# Patient Record
Sex: FEMALE | Race: WHITE | HISPANIC OR LATINO
[De-identification: names, ages, dates, MRNs, and addresses within clinical notes are randomized per-mention and may not be internally consistent; named-entity substitution may affect disease eponyms.]

---

## 2022-04-07 ENCOUNTER — APPOINTMENT (OUTPATIENT)
Dept: SURGERY | Facility: CLINIC | Age: 37
End: 2022-04-07
Payer: MEDICAID

## 2022-04-07 VITALS
WEIGHT: 196 LBS | HEIGHT: 62 IN | SYSTOLIC BLOOD PRESSURE: 113 MMHG | TEMPERATURE: 97.5 F | BODY MASS INDEX: 36.07 KG/M2 | RESPIRATION RATE: 16 BRPM | DIASTOLIC BLOOD PRESSURE: 77 MMHG | HEART RATE: 95 BPM | OXYGEN SATURATION: 98 %

## 2022-04-07 PROCEDURE — 99204 OFFICE O/P NEW MOD 45 MIN: CPT

## 2022-04-07 NOTE — ASSESSMENT
[FreeTextEntry1] : Ms. LEONE is a 37 year old woman with ventral hernia (3 defects identified on CT scan). We discussed the importance of my reviewing the CT images prior to discussing surgical planning. We discussed that possible surgical options would range from minimally invasive repair with mesh to open repair with mesh, possible component separation, and possible botox. Weight loss would also be recommended before elective repair.

## 2022-04-07 NOTE — PLAN
[FreeTextEntry1] : No emergent surgical intervention\par Ms. Vargas will return to office with CT imaging disk

## 2022-04-07 NOTE — PHYSICAL EXAM
[No Rash or Lesion] : No rash or lesion [Alert] : alert [Oriented to Person] : oriented to person [Oriented to Place] : oriented to place [Oriented to Time] : oriented to time [Calm] : calm [JVD] : no jugular venous distention  [de-identified] : No acute distress [de-identified] : No respiratory distress [de-identified] : Regular rate [de-identified] : soft, nontender. no rebound or guarding. palpable supraumbilical and infraumbilical hernia defect along well-healed midline incision - reducible [de-identified] : normal range of motion

## 2022-04-07 NOTE — HISTORY OF PRESENT ILLNESS
[de-identified] : Ms. 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 Pt states that she was seen in [facility] ED 1 week ago for abdominal pain and nausea and had CT scan performed, which demonstrated "3 hernias" per patient. The patient reports intermittent pain, especially at end of day and with activity. No incarceration. Denies fever/chills/nausea/emesis or changes in bowel or bladder habits. Tolerating diet. Normal bowel movements.\par \par denies smoking\par \par Outside hospital records reviewed - 2 CT scan reads from January 2022 seen, which both report 3 ventral hernias (2 supraumbilical, one containing non-obstructed transverse colon 1 infraumbilical containing nonobstructed small bowel). No documentation from recent ED visit included

## 2022-04-08 ENCOUNTER — NON-APPOINTMENT (OUTPATIENT)
Age: 37
End: 2022-04-08

## 2022-04-14 ENCOUNTER — APPOINTMENT (OUTPATIENT)
Dept: SURGERY | Facility: CLINIC | Age: 37
End: 2022-04-14
Payer: MEDICAID

## 2022-04-14 VITALS
DIASTOLIC BLOOD PRESSURE: 64 MMHG | OXYGEN SATURATION: 98 % | HEIGHT: 62 IN | SYSTOLIC BLOOD PRESSURE: 110 MMHG | HEART RATE: 81 BPM | BODY MASS INDEX: 36.62 KG/M2 | RESPIRATION RATE: 16 BRPM | TEMPERATURE: 97.3 F | WEIGHT: 199 LBS

## 2022-04-14 PROCEDURE — 99214 OFFICE O/P EST MOD 30 MIN: CPT

## 2022-04-14 NOTE — PLAN
[FreeTextEntry1] : Referral to medical weight loss specialist provided \par 1-to-1 counseling with registered dietitian recommended \par Return to office in 1 month

## 2022-04-14 NOTE — ASSESSMENT
[FreeTextEntry1] : Ms. LEONE is a 37 year old woman with incisional hernia. We discussed the importance of preoperative weight loss to decrease risk of recurrence and morbidity. We discussed that surgical repair would be performed as an open repair with mesh, possible component separation with preoperative botox injection 3-4 weeks prior.

## 2022-04-14 NOTE — PHYSICAL EXAM
[No Rash or Lesion] : No rash or lesion [Alert] : alert [Oriented to Person] : oriented to person [Oriented to Place] : oriented to place [Oriented to Time] : oriented to time [Calm] : calm [JVD] : no jugular venous distention  [de-identified] : No acute distress [de-identified] : No respiratory distress [de-identified] : Regular rate [de-identified] : soft, nontender. no rebound or guarding. palpable supraumbilical and infraumbilical hernia defect along well-healed midline incision - reducible [de-identified] : normal range of motion

## 2022-04-14 NOTE — HISTORY OF PRESENT ILLNESS
[de-identified] :  number [id number]\par \par Ms. 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 U/S and CT imaging now available. The patient again reports intermittent pain, especially at end of day and with activity. Denies fever/chills/nausea/emesis or changes in bowel or bladder habits. Tolerating diet. Normal bowel movements.\par \par denies smoking\par \par Imaging reviewed: \par \par US- no evidence of cholelithiasis, hepatomegaly with hepatic steatosis\par CTAP- large complex incisional hernia appreciated, largest width ~8cm between rectus muscles

## 2022-04-19 ENCOUNTER — APPOINTMENT (OUTPATIENT)
Dept: SURGERY | Facility: CLINIC | Age: 37
End: 2022-04-19
Payer: MEDICAID

## 2022-04-19 ENCOUNTER — TRANSCRIPTION ENCOUNTER (OUTPATIENT)
Age: 37
End: 2022-04-19

## 2022-04-19 VITALS
DIASTOLIC BLOOD PRESSURE: 78 MMHG | OXYGEN SATURATION: 98 % | BODY MASS INDEX: 36.28 KG/M2 | RESPIRATION RATE: 16 BRPM | TEMPERATURE: 98.1 F | WEIGHT: 197.13 LBS | HEART RATE: 92 BPM | SYSTOLIC BLOOD PRESSURE: 113 MMHG | HEIGHT: 62 IN

## 2022-04-19 DIAGNOSIS — Z71.3 DIETARY COUNSELING AND SURVEILLANCE: ICD-10-CM

## 2022-04-19 DIAGNOSIS — E66.9 OBESITY, UNSPECIFIED: ICD-10-CM

## 2022-04-19 DIAGNOSIS — Z00.00 ENCOUNTER FOR GENERAL ADULT MEDICAL EXAMINATION W/OUT ABNORMAL FINDINGS: ICD-10-CM

## 2022-04-19 PROCEDURE — T1013A: CUSTOM

## 2022-04-19 PROCEDURE — 99213 OFFICE O/P EST LOW 20 MIN: CPT

## 2022-04-22 ENCOUNTER — APPOINTMENT (OUTPATIENT)
Dept: BARIATRICS/WEIGHT MGMT | Facility: CLINIC | Age: 37
End: 2022-04-22
Payer: MEDICAID

## 2022-04-22 ENCOUNTER — APPOINTMENT (OUTPATIENT)
Dept: BARIATRICS/WEIGHT MGMT | Facility: CLINIC | Age: 37
End: 2022-04-22

## 2022-04-22 DIAGNOSIS — Z78.9 OTHER SPECIFIED HEALTH STATUS: ICD-10-CM

## 2022-04-22 PROCEDURE — 99205 OFFICE O/P NEW HI 60 MIN: CPT | Mod: 95

## 2022-04-22 RX ORDER — NAPROXEN 375 MG/1
375 TABLET ORAL
Qty: 28 | Refills: 0 | Status: DISCONTINUED | COMMUNITY
Start: 2022-03-24

## 2022-04-22 RX ORDER — METHOCARBAMOL 750 MG/1
750 TABLET, FILM COATED ORAL
Qty: 20 | Refills: 0 | Status: DISCONTINUED | COMMUNITY
Start: 2022-04-05

## 2022-04-22 RX ORDER — ONDANSETRON 4 MG/1
4 TABLET ORAL
Qty: 15 | Refills: 0 | Status: DISCONTINUED | COMMUNITY
Start: 2022-03-24

## 2022-04-25 PROBLEM — E66.9 OBESITY, CLASS II, BMI 35-39.9: Status: ACTIVE | Noted: 2022-04-22

## 2022-04-25 PROBLEM — Z71.3 DIETARY COUNSELING AND SURVEILLANCE: Status: ACTIVE | Noted: 2022-04-25

## 2022-04-25 PROBLEM — Z00.00 ENCOUNTER FOR PREVENTIVE HEALTH EXAMINATION: Status: ACTIVE | Noted: 2022-04-06

## 2022-04-25 NOTE — REASON FOR VISIT
[Follow-Up Visit] : a follow-up visit for [Morbid Obesity (BMI<40)] : morbid obesity (bmi<40) [[employer] ] : provided by [employer]   [Time Spent: ____ minutes] : Total time spent using  services: [unfilled] minutes. The patient's primary language is not English thus required  services. [Interpreters_IDNumber] : [id number] [Interpreters_FullName] : Courtney

## 2022-04-25 NOTE — HISTORY OF PRESENT ILLNESS
[de-identified] : Ms. RONNY LEONE is a 37 year old with history of obesity, multiple hernias, was seen by Dr. Guerra for surgical evaluation. Here today for dietary counseling. Feels well, denies pain, fever, chills, nausea or vomiting.\par

## 2022-04-25 NOTE — ASSESSMENT
[FreeTextEntry1] : Pt is a 37 year old F, whose current wt is 197#, which is above IBW range and BMI is indicative of morbidly obese wt status (BMI: 36.05). Pt has a hernia and Dr. Guerra would like pt to lose weight prior to hernia repair.\par \par Breakfast: Eggs, Coffee (powder milk, sugar)\par Lunch: Beans, meat, rice or spaghetti\par Dinner: 1 cup of milk and cake\par Fluids: Juice\par Alcohol: None\par \par Pt reports old habits as large portion sizes and eating past the point of satisfied as obstacles to weight control. Patient admits to eating quickly, skipping meals, and poor food choices. Discussed the importance of a balanced, healthy diet of nourishing foods and consistent meal pattern for long-term wt loss success and health maintenance. Pt educated on cutting out caloric beverages sabotaging wt loss as caloric fluids provide little satiation and empty calories. Discussed how sugary beverages may be poorly tolerated post-operatively. Pt verbalized understanding and states She will cut out sugary drinks and increase water intake to facilitate adequate hydration. Also discussed consistent protein containing meals and snacks for hunger regulation. Pt verbalized understanding and expressed gratitude.\par \par Exercise: None - Discussed going on walks with family to a goal of 30 minutes per day. Pt verbalized understanding.  \par \par PRE-OPERATIVE NUTRITION GOALS: \par -Eliminate calories in beverages \par -Consume consistent protein containing meals \par -Work to improve quality of food choice to maximize nourishment post-operatively \par -Limit snacks outside of physical hunger \par -Increase physical activity to maximize wt loss goals\par \par Will follow up with pt in 1 month. RD to remain available for ongoing support and encouragement.

## 2022-05-05 ENCOUNTER — APPOINTMENT (OUTPATIENT)
Dept: ULTRASOUND IMAGING | Facility: CLINIC | Age: 37
End: 2022-05-05
Payer: MEDICAID

## 2022-05-05 PROCEDURE — 93880 EXTRACRANIAL BILAT STUDY: CPT

## 2022-05-13 NOTE — HISTORY OF PRESENT ILLNESS
[Quality of Life] : Quality of life [Improve Mood] : Improved mood [Improve Family's Health] : Improve family's health [Young Adult] : yound adult [Cut/Track Calories] : cut/track calories [Poor eating habits] : poor eating habits [Cravings] : cravings [Move to new town/country] : move to new town/country [I usually sleep 6-8 hours] : I usually sleep 6-8 hours [Dinner] : dinner [Morning] : morning [Other: ___] : [unfilled] [Less than 1] : Vegetables: less than 1 [1-2] : Meat, fish, poultry, eggs, cheese: 1-2 [3-5] : Sweets: 3-5 [2] : Fast food - meals per week: 2 [3] : How many cups of juice do you drink per day: 3 [1] : Cups of coffee per day: 1 [Milk] : milk [Sugar] : sugar [Self] : self [Spouse/partner] : spouse/partner [Overlarge portions] : overlarge portions [Skip Meals] : skip meals [2 blocks] : Walking distance capability:2 blocks [Walking] : walking [Biking] : biking [0] : 0 [None] : none [FreeTextEntry3] : 197 [] : No [FreeTextEntry1] : 37 year old with a  hx of medical obesity in need of wt loss for surgical hernia repair \par \par Breakfast\par 2 boiled eggs\par corn tortilla with cheese\par coffee with sugar and milk\par or \par corn tortilla with cheese \par coffee\par crackers  \par \par \par snack \par plum x 1\par cereal \par cracker with a sweet topping \par \par \par Lunch usually about 4 pm \par chicken \par salad  + dressing -ranch 1T \par boiled potato\par \par \par snack \par mandarin about 9 pm \par does not eat late at night\par \par no exercise \par no ETOH\par sleep 7-8\par \par She feels she is addicted to sweets \par \par

## 2022-05-13 NOTE — CONSULT LETTER
[Dear  ___] : Dear  [unfilled], [Consult Letter:] : I had the pleasure of evaluating your patient, [unfilled]. [Please see my note below.] : Please see my note below. [Sincerely,] : Sincerely, [FreeTextEntry3] : Marleen Jarrett

## 2022-05-13 NOTE — ASSESSMENT
[FreeTextEntry1] : 37 year old woman with a hx of morbid obesity for years never successful with wt loss now with a hernia that  needs repair however she needs to lose wt prior to repair for improved results.She complains of an addiction to sweets, cannot stop thinking about then and wanting to have sweets in her mouth, complains of fatigue after a  relatively small amount of walking.  She needs to have\par 1. labs\par 2 get involved with a nutritionist , will set up with Carie Cespedes who is Amharic speaking and who culturally understands her eating habit better \par 3 start walking 15 min 3x week with a goal of 150 min /week\par 4consider wellbutrin or wellbutrin/naltrexone for her addicition to food\par 5 get rid of the whites\par 6 no eating after 8 pm \par 7 no drinking her calories eg juices, soda\par 8 follow up appointment in person and tele\par 90 min \par \par 5/13/22: \par labs reviewed\par elevated insulin, choles 206/45/110/258 ,  , A!C 5.0

## 2022-05-13 NOTE — REASON FOR VISIT
[Home] : at home, [unfilled] , at the time of the visit. [Verbal consent obtained from patient] : the patient, [unfilled] [Initial Consultation] : an initial consultation for [Obesity] : obesity [FreeTextEntry2] : here for help with wt loss : spoken throught an , Nghia [id number]

## 2022-05-13 NOTE — REVIEW OF SYSTEMS
[Negative] : Genitourinary [MED-ROS-Gastro-FT] : constipation  [MED-FRANCISCO-Luis Miguel-FT] : chronic foot pain

## 2022-05-17 ENCOUNTER — APPOINTMENT (OUTPATIENT)
Dept: BARIATRICS/WEIGHT MGMT | Facility: CLINIC | Age: 37
End: 2022-05-17
Payer: MEDICAID

## 2022-05-17 VITALS — WEIGHT: 197 LBS | HEIGHT: 62 IN | BODY MASS INDEX: 36.25 KG/M2

## 2022-05-17 PROCEDURE — 97802 MEDICAL NUTRITION INDIV IN: CPT | Mod: 95

## 2022-05-18 ENCOUNTER — NON-APPOINTMENT (OUTPATIENT)
Age: 37
End: 2022-05-18

## 2022-05-18 LAB
25(OH)D3 SERPL-MCNC: 33.2 NG/ML
ALBUMIN SERPL ELPH-MCNC: 4.6 G/DL
ALP BLD-CCNC: 91 U/L
ALT SERPL-CCNC: 308 U/L
ANION GAP SERPL CALC-SCNC: 14 MMOL/L
AST SERPL-CCNC: 232 U/L
BASOPHILS # BLD AUTO: 0.04 K/UL
BASOPHILS NFR BLD AUTO: 0.5 %
BILIRUB SERPL-MCNC: 0.2 MG/DL
BUN SERPL-MCNC: 12 MG/DL
CALCIUM SERPL-MCNC: 9.8 MG/DL
CHLORIDE SERPL-SCNC: 102 MMOL/L
CHOLEST SERPL-MCNC: 206 MG/DL
CO2 SERPL-SCNC: 24 MMOL/L
CREAT SERPL-MCNC: 0.71 MG/DL
CRP SERPL-MCNC: 13 MG/L
EGFR: 114 ML/MIN/1.73M2
EOSINOPHIL # BLD AUTO: 0.19 K/UL
EOSINOPHIL NFR BLD AUTO: 2.2 %
ESTIMATED AVERAGE GLUCOSE: 97 MG/DL
GLUCOSE SERPL-MCNC: 94 MG/DL
HBA1C MFR BLD HPLC: 5 %
HCT VFR BLD CALC: 40.9 %
HDLC SERPL-MCNC: 45 MG/DL
HGB BLD-MCNC: 13.2 G/DL
IMM GRANULOCYTES NFR BLD AUTO: 0.5 %
INSULIN SERPL-MCNC: 32.6 UU/ML
LDLC SERPL CALC-MCNC: 110 MG/DL
LYMPHOCYTES # BLD AUTO: 2.87 K/UL
LYMPHOCYTES NFR BLD AUTO: 33 %
MAN DIFF?: NORMAL
MCHC RBC-ENTMCNC: 29.7 PG
MCHC RBC-ENTMCNC: 32.3 GM/DL
MCV RBC AUTO: 91.9 FL
MONOCYTES # BLD AUTO: 0.78 K/UL
MONOCYTES NFR BLD AUTO: 9 %
NEUTROPHILS # BLD AUTO: 4.79 K/UL
NEUTROPHILS NFR BLD AUTO: 54.8 %
NONHDLC SERPL-MCNC: 162 MG/DL
PLATELET # BLD AUTO: 403 K/UL
POTASSIUM SERPL-SCNC: 4.3 MMOL/L
PROT SERPL-MCNC: 7.5 G/DL
RBC # BLD: 4.45 M/UL
RBC # FLD: 13.3 %
SODIUM SERPL-SCNC: 140 MMOL/L
T3FREE SERPL-MCNC: 2.93 PG/ML
T4 FREE SERPL-MCNC: 1.1 NG/DL
TRIGL SERPL-MCNC: 258 MG/DL
TSH SERPL-ACNC: 1.57 UIU/ML
WBC # FLD AUTO: 8.71 K/UL

## 2022-05-19 ENCOUNTER — APPOINTMENT (OUTPATIENT)
Dept: SURGERY | Facility: CLINIC | Age: 37
End: 2022-05-19
Payer: MEDICAID

## 2022-05-19 VITALS
WEIGHT: 191.5 LBS | SYSTOLIC BLOOD PRESSURE: 111 MMHG | HEIGHT: 62 IN | TEMPERATURE: 98.7 F | OXYGEN SATURATION: 99 % | BODY MASS INDEX: 35.24 KG/M2 | HEART RATE: 71 BPM | DIASTOLIC BLOOD PRESSURE: 77 MMHG

## 2022-05-19 PROCEDURE — 99214 OFFICE O/P EST MOD 30 MIN: CPT

## 2022-05-19 NOTE — PHYSICAL EXAM
[JVD] : no jugular venous distention  [No Rash or Lesion] : No rash or lesion [Alert] : alert [Oriented to Person] : oriented to person [Oriented to Place] : oriented to place [Oriented to Time] : oriented to time [Calm] : calm [de-identified] : No acute distress [de-identified] : No respiratory distress [de-identified] : Regular rate [de-identified] : soft, nontender. no rebound or guarding. palpable supraumbilical and infraumbilical hernia defect along well-healed midline incision - reducible [de-identified] : normal range of motion

## 2022-05-19 NOTE — ASSESSMENT
[FreeTextEntry1] : Ms. LEONE is a 37 year old woman with incisional hernia. We again discussed the importance of preoperative weight loss to decrease risk of recurrence and morbidity. We discussed that surgical repair would be performed as an open repair with mesh, possible component separation with preoperative botox injection 3-4 weeks prior.

## 2022-05-19 NOTE — HISTORY OF PRESENT ILLNESS
[de-identified] :  number [id number]\par \par Ms. 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 She has lost 8 pounds since last visit. The patient again reports intermittent pain, especially at end of day and with activity. Denies fever/chills/nausea/emesis or changes in bowel or bladder habits. Tolerating diet. Normal bowel movements.\par \par denies smoking\par \par Imaging again reviewed: \par \par US- no evidence of cholelithiasis, hepatomegaly with hepatic steatosis\par CTAP- large complex incisional hernia appreciated, largest width ~8cm between rectus muscles

## 2022-05-19 NOTE — PLAN
[FreeTextEntry1] : Continue weight loss\par Return to office in 6 weeks to discuss operative planning

## 2022-06-03 ENCOUNTER — APPOINTMENT (OUTPATIENT)
Dept: ULTRASOUND IMAGING | Facility: CLINIC | Age: 37
End: 2022-06-03
Payer: MEDICAID

## 2022-06-03 PROCEDURE — 76705 ECHO EXAM OF ABDOMEN: CPT

## 2022-06-13 ENCOUNTER — APPOINTMENT (OUTPATIENT)
Dept: BARIATRICS/WEIGHT MGMT | Facility: CLINIC | Age: 37
End: 2022-06-13

## 2022-06-21 ENCOUNTER — APPOINTMENT (OUTPATIENT)
Dept: BARIATRICS/WEIGHT MGMT | Facility: CLINIC | Age: 37
End: 2022-06-21
Payer: MEDICAID

## 2022-06-21 PROCEDURE — 97803 MED NUTRITION INDIV SUBSEQ: CPT | Mod: 95

## 2022-06-22 VITALS — BODY MASS INDEX: 36.07 KG/M2 | WEIGHT: 196 LBS | HEIGHT: 62 IN

## 2022-06-30 ENCOUNTER — APPOINTMENT (OUTPATIENT)
Dept: SURGERY | Facility: CLINIC | Age: 37
End: 2022-06-30

## 2022-06-30 VITALS
DIASTOLIC BLOOD PRESSURE: 70 MMHG | WEIGHT: 194 LBS | HEIGHT: 62 IN | HEART RATE: 71 BPM | BODY MASS INDEX: 35.7 KG/M2 | OXYGEN SATURATION: 99 % | TEMPERATURE: 97.1 F | SYSTOLIC BLOOD PRESSURE: 106 MMHG | RESPIRATION RATE: 16 BRPM

## 2022-06-30 DIAGNOSIS — K43.2 INCISIONAL HERNIA W/OUT OBSTRUCTION OR GANGRENE: ICD-10-CM

## 2022-06-30 PROCEDURE — 99214 OFFICE O/P EST MOD 30 MIN: CPT

## 2022-06-30 NOTE — HISTORY OF PRESENT ILLNESS
[de-identified] : Ms. 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 Patient has lost 2 pounds overall since initial visit, but gained weight since last visit. The patient again reports intermittent pain, especially at end of day and with activity. Denies fever/chills/nausea/emesis or changes in bowel or bladder habits. Tolerating diet. Normal bowel movements.\par \par denies smoking\par \par Imaging again reviewed: \par \par US- no evidence of cholelithiasis, hepatomegaly with hepatic steatosis\par CTAP- large complex incisional hernia appreciated, largest width ~8cm between rectus muscles

## 2022-06-30 NOTE — PHYSICAL EXAM
[JVD] : no jugular venous distention  [No Rash or Lesion] : No rash or lesion [Alert] : alert [Oriented to Person] : oriented to person [Oriented to Place] : oriented to place [Oriented to Time] : oriented to time [Calm] : calm [de-identified] : No acute distress [de-identified] : No respiratory distress [de-identified] : Regular rate [de-identified] : soft, nontender. no rebound or guarding. palpable supraumbilical and infraumbilical hernia defect along well-healed midline incision - reducible [de-identified] : normal range of motion

## 2022-06-30 NOTE — ASSESSMENT
[FreeTextEntry1] : Ms. VASU JACOB is a 37 year old woman with incisional hernia. We discussed the options of robotic/laparoscopic repair, open repair, and nonoperative management. We discussed the risks/benefits of performing hernia repair as a retrorectus incisional hernia repair. We discussed possibility that a component separation would be required for tension free repair. We also discussed the risks/benefits of preoperative botox injection to decrease risks of recurrence and to decrease the likelihood that component separation would be required. The risks/benefits and alternatives were discussed at length and all questions were answered. We discussed the risks and benefits of the use of mesh. We also again reviewed the importance of preoperative weight loss to decrease risk of recurrence and morbidity. We discussed the option of waiting and attempting additional weight loss, howver patient states that she is experiencing pain at hernia site and does not want to wait any longer. The patient appears to understand and wishes to proceed with preoperative botox injection in the bilateral abdominal wall followed by open retrorectus incisional hernia repair with mesh, possible component separation. We will plan for surgery approximately 3-4 weeks following botox injection.\par

## 2022-06-30 NOTE — PLAN
[FreeTextEntry1] : Plan for preoperative botox injection in the bilateral abdominal wall followed by open retrorectus incisional hernia repair with mesh, possible component separation

## 2022-07-12 ENCOUNTER — OUTPATIENT (OUTPATIENT)
Dept: OUTPATIENT SERVICES | Facility: HOSPITAL | Age: 37
LOS: 1 days | End: 2022-07-12
Payer: MEDICAID

## 2022-07-12 VITALS
RESPIRATION RATE: 17 BRPM | HEART RATE: 74 BPM | TEMPERATURE: 98 F | SYSTOLIC BLOOD PRESSURE: 108 MMHG | OXYGEN SATURATION: 97 % | WEIGHT: 200.84 LBS | DIASTOLIC BLOOD PRESSURE: 67 MMHG | HEIGHT: 62 IN

## 2022-07-12 DIAGNOSIS — Z98.890 OTHER SPECIFIED POSTPROCEDURAL STATES: Chronic | ICD-10-CM

## 2022-07-12 DIAGNOSIS — Z98.891 HISTORY OF UTERINE SCAR FROM PREVIOUS SURGERY: Chronic | ICD-10-CM

## 2022-07-12 DIAGNOSIS — Z91.89 OTHER SPECIFIED PERSONAL RISK FACTORS, NOT ELSEWHERE CLASSIFIED: ICD-10-CM

## 2022-07-12 DIAGNOSIS — K43.2 INCISIONAL HERNIA WITHOUT OBSTRUCTION OR GANGRENE: ICD-10-CM

## 2022-07-12 DIAGNOSIS — Z01.818 ENCOUNTER FOR OTHER PREPROCEDURAL EXAMINATION: ICD-10-CM

## 2022-07-12 LAB
ALBUMIN SERPL ELPH-MCNC: 4.3 G/DL — SIGNIFICANT CHANGE UP (ref 3.3–5.2)
ALP SERPL-CCNC: 102 U/L — SIGNIFICANT CHANGE UP (ref 40–120)
ALT FLD-CCNC: 119 U/L — HIGH
ANION GAP SERPL CALC-SCNC: 11 MMOL/L — SIGNIFICANT CHANGE UP (ref 5–17)
APTT BLD: 29.7 SEC — SIGNIFICANT CHANGE UP (ref 27.5–35.5)
AST SERPL-CCNC: 82 U/L — HIGH
BASOPHILS # BLD AUTO: 0.03 K/UL — SIGNIFICANT CHANGE UP (ref 0–0.2)
BASOPHILS NFR BLD AUTO: 0.3 % — SIGNIFICANT CHANGE UP (ref 0–2)
BILIRUB SERPL-MCNC: 0.2 MG/DL — LOW (ref 0.4–2)
BUN SERPL-MCNC: 10.4 MG/DL — SIGNIFICANT CHANGE UP (ref 8–20)
CALCIUM SERPL-MCNC: 9.3 MG/DL — SIGNIFICANT CHANGE UP (ref 8.6–10.2)
CHLORIDE SERPL-SCNC: 101 MMOL/L — SIGNIFICANT CHANGE UP (ref 98–107)
CO2 SERPL-SCNC: 25 MMOL/L — SIGNIFICANT CHANGE UP (ref 22–29)
CREAT SERPL-MCNC: 0.57 MG/DL — SIGNIFICANT CHANGE UP (ref 0.5–1.3)
EGFR: 121 ML/MIN/1.73M2 — SIGNIFICANT CHANGE UP
EOSINOPHIL # BLD AUTO: 0.21 K/UL — SIGNIFICANT CHANGE UP (ref 0–0.5)
EOSINOPHIL NFR BLD AUTO: 2 % — SIGNIFICANT CHANGE UP (ref 0–6)
GLUCOSE SERPL-MCNC: 103 MG/DL — HIGH (ref 70–99)
HCG SERPL-ACNC: <4 MIU/ML — SIGNIFICANT CHANGE UP
HCT VFR BLD CALC: 37 % — SIGNIFICANT CHANGE UP (ref 34.5–45)
HGB BLD-MCNC: 12.3 G/DL — SIGNIFICANT CHANGE UP (ref 11.5–15.5)
IMM GRANULOCYTES NFR BLD AUTO: 0.6 % — SIGNIFICANT CHANGE UP (ref 0–1.5)
INR BLD: 0.99 RATIO — SIGNIFICANT CHANGE UP (ref 0.88–1.16)
LYMPHOCYTES # BLD AUTO: 2.66 K/UL — SIGNIFICANT CHANGE UP (ref 1–3.3)
LYMPHOCYTES # BLD AUTO: 25.4 % — SIGNIFICANT CHANGE UP (ref 13–44)
MCHC RBC-ENTMCNC: 29.2 PG — SIGNIFICANT CHANGE UP (ref 27–34)
MCHC RBC-ENTMCNC: 33.2 GM/DL — SIGNIFICANT CHANGE UP (ref 32–36)
MCV RBC AUTO: 87.9 FL — SIGNIFICANT CHANGE UP (ref 80–100)
MONOCYTES # BLD AUTO: 0.79 K/UL — SIGNIFICANT CHANGE UP (ref 0–0.9)
MONOCYTES NFR BLD AUTO: 7.6 % — SIGNIFICANT CHANGE UP (ref 2–14)
NEUTROPHILS # BLD AUTO: 6.71 K/UL — SIGNIFICANT CHANGE UP (ref 1.8–7.4)
NEUTROPHILS NFR BLD AUTO: 64.1 % — SIGNIFICANT CHANGE UP (ref 43–77)
PLATELET # BLD AUTO: 425 K/UL — HIGH (ref 150–400)
POTASSIUM SERPL-MCNC: 3.8 MMOL/L — SIGNIFICANT CHANGE UP (ref 3.5–5.3)
POTASSIUM SERPL-SCNC: 3.8 MMOL/L — SIGNIFICANT CHANGE UP (ref 3.5–5.3)
PROT SERPL-MCNC: 7.4 G/DL — SIGNIFICANT CHANGE UP (ref 6.6–8.7)
PROTHROM AB SERPL-ACNC: 11.5 SEC — SIGNIFICANT CHANGE UP (ref 10.5–13.4)
RBC # BLD: 4.21 M/UL — SIGNIFICANT CHANGE UP (ref 3.8–5.2)
RBC # FLD: 13.3 % — SIGNIFICANT CHANGE UP (ref 10.3–14.5)
SODIUM SERPL-SCNC: 137 MMOL/L — SIGNIFICANT CHANGE UP (ref 135–145)
WBC # BLD: 10.46 K/UL — SIGNIFICANT CHANGE UP (ref 3.8–10.5)
WBC # FLD AUTO: 10.46 K/UL — SIGNIFICANT CHANGE UP (ref 3.8–10.5)

## 2022-07-12 PROCEDURE — 85730 THROMBOPLASTIN TIME PARTIAL: CPT

## 2022-07-12 PROCEDURE — G0463: CPT

## 2022-07-12 PROCEDURE — 84702 CHORIONIC GONADOTROPIN TEST: CPT

## 2022-07-12 PROCEDURE — 36415 COLL VENOUS BLD VENIPUNCTURE: CPT

## 2022-07-12 PROCEDURE — 80053 COMPREHEN METABOLIC PANEL: CPT

## 2022-07-12 PROCEDURE — 85025 COMPLETE CBC W/AUTO DIFF WBC: CPT

## 2022-07-12 PROCEDURE — 85610 PROTHROMBIN TIME: CPT

## 2022-07-12 RX ORDER — SODIUM CHLORIDE 9 MG/ML
3 INJECTION INTRAMUSCULAR; INTRAVENOUS; SUBCUTANEOUS ONCE
Refills: 0 | Status: DISCONTINUED | OUTPATIENT
Start: 2022-07-19 | End: 2022-08-02

## 2022-07-12 NOTE — H&P PST ADULT - ASSESSMENT
Patient educated on surgical scrub, COVID testing 22, preadmission instructions and day of procedure medications, verbalizes understanding. Pt instructed to stop vitamins/supplements/herbal medications/ASA/NSAIDS for one week prior to surgery and discuss with PMD.    CAPRINI SCORE    AGE RELATED RISK FACTORS                                                             [ ] Age 41-60 years                                            (1 Point)  [ ] Age: 61-74 years                                           (2 Points)                 [ ] Age= 75 years                                                (3 Points)             DISEASE RELATED RISK FACTORS                                                       [ ] Edema in the lower extremities                 (1 Point)                     [ ] Varicose veins                                               (1 Point)                                 [ ] BMI > 25 Kg/m2                                            (1 Point)                                  [ ] Serious infection (ie PNA)                            (1 Point)                     [ ] Lung disease ( COPD, Emphysema)            (1 Point)                                                                          [ ] Acute myocardial infarction                         (1 Point)                  [ ] Congestive heart failure (in the previous month)  (1 Point)         [ ] Inflammatory bowel disease                            (1 Point)                  [ ] Central venous access, PICC or Port               (2 points)       (within the last month)                                                                [ ] Stroke (in the previous month)                        (5 Points)    [ ] Previous or present malignancy                       (2 points)                                                                                                                                                         HEMATOLOGY RELATED FACTORS                                                         [ ] Prior episodes of VTE                                     (3 Points)                     [ ] Positive family history for VTE                      (3 Points)                  [ ] Prothrombin 77685 A                                     (3 Points)                     [ ] Factor V Leiden                                                (3 Points)                        [ ] Lupus anticoagulants                                      (3 Points)                                                           [ ] Anticardiolipin antibodies                              (3 Points)                                                       [ ] High homocysteine in the blood                   (3 Points)                                             [ ] Other congenital or acquired thrombophilia      (3 Points)                                                [ ] Heparin induced thrombocytopenia                  (3 Points)                                        MOBILITY RELATED FACTORS  [ ] Bed rest                                                         (1 Point)  [ ] Plaster cast                                                    (2 points)  [ ] Bed bound for more than 72 hours           (2 Points)    GENDER SPECIFIC FACTORS  [ ] Pregnancy or had a baby within the last month   (1 Point)  [ ] Post-partum < 6 weeks                                   (1 Point)  [ ] Hormonal therapy  or oral contraception   (1 Point)  [ ] History of pregnancy complications              (1 point)  [ ] Unexplained or recurrent              (1 Point)    OTHER RISK FACTORS                                           (1 Point)  [ ] BMI >40, smoking, diabetes requiring insulin, chemotherapy  blood transfusions and length of surgery over 2 hours    SURGERY RELATED RISK FACTORS  [ ]  Section within the last month     (1 Point)  [ ] Minor surgery                                                  (1 Point)  [ ] Arthroscopic surgery                                       (2 Points)  [ ] Planned major surgery lasting more            (2 Points)      than 45 minutes     [ ] Elective hip or knee joint replacement       (5 points)       surgery                                                TRAUMA RELATED RISK FACTORS  [ ] Fracture of the hip, pelvis, or leg                       (5 Points)  [ ] Spinal cord injury resulting in paralysis             (5 points)       (in the previous month)    [ ] Paralysis  (less than 1 month)                             (5 Points)  [ ] Multiple Trauma within 1 month                        (5 Points)    Total Score [        ]    Caprini Score 0-2: Low Risk, NO VTE prophylaxis required for most patients, encourage ambulation  Caprini Score 3-6: Moderate Risk , pharmacologic VTE prophylaxis is indicated for most patients (in the absence of contraindications)  Caprini Score Greater than or =7: High risk, pharmocologic VTE prophylaxis indicated for most patients (in the absence of contraindications)    OPIOID RISK TOOL    CHAPARRO EACH BOX THAT APPLIES AND ADD TOTALS AT THE END    FAMILY HISTORY OF SUBSTANCE ABUSE                 FEMALE         MALE                                                Alcohol                             [  ]1 pt          [  ]3pts                                               Illegal Durgs                     [  ]2 pts        [  ]3pts                                               Rx Drugs                           [  ]4 pts        [  ]4 pts    PERSONAL HISTORY OF SUBSTANCE ABUSE                                                                                          Alcohol                             [  ]3 pts       [  ]3 pts                                               Illegal Drugs                     [  ]4 pts        [  ]4 pts                                               Rx Drugs                           [  ]5 pts        [  ]5 pts    AGE BETWEEN 16-45 YEARS                                      [  ]1 pt         [  ]1 pt    HISTORY OF PREADOLESCENT   SEXUAL ABUSE                                                             [  ]3 pts        [  ]0pts    PSYCHOLOGICAL DISEASE                     ADD, OCD, Bipolar, Schizophrenia        [  ]2 pts         [  ]2 pts                      Depression                                               [  ]1 pt           [  ]1 pt           SCORING TOTAL   (add numbers and type here)              (***)                                     A score of 3 or lower indicated LOW risk for future opioid abuse  A score of 4 to 7 indicated moderate risk for future opioid abuse  A score of 8 or higher indicates a high risk for opioid abuse   36 y/o female with PMH of elevated liver enzymes and hernia presents to [facility]. Patient states she has been found to have multiple hernias. She is scheduled to have the hernias repair in august but is going to have botox injections done in the abdomen first. Reports she feels she stomach is bloated and distended, denies pain or discomfort. Denies fevers, chills or vomiting. She states she often feels nausea. Patient is scheduled for Botox injection with anesthesia ordered by Dr. Guerra, going to be performed by Dr. Cisneros. Patient educated on surgical scrub, COVID testing 22, preadmission instructions and day of procedure medications, verbalizes understanding. Pt instructed to stop vitamins/supplements/herbal medications/ASA/NSAIDS for one week prior to surgery and discuss with PMD.    CAPRINI SCORE    AGE RELATED RISK FACTORS                                                             [ ] Age 41-60 years                                            (1 Point)  [ ] Age: 61-74 years                                           (2 Points)                 [ ] Age= 75 years                                                (3 Points)             DISEASE RELATED RISK FACTORS                                                       [ ] Edema in the lower extremities                 (1 Point)                     [ ] Varicose veins                                               (1 Point)                                 [x ] BMI > 25 Kg/m2                                            (1 Point)                                  [ ] Serious infection (ie PNA)                            (1 Point)                     [ ] Lung disease ( COPD, Emphysema)            (1 Point)                                                                          [ ] Acute myocardial infarction                         (1 Point)                  [ ] Congestive heart failure (in the previous month)  (1 Point)         [ ] Inflammatory bowel disease                            (1 Point)                  [ ] Central venous access, PICC or Port               (2 points)       (within the last month)                                                                [ ] Stroke (in the previous month)                        (5 Points)    [ ] Previous or present malignancy                       (2 points)                                                                                                                                                         HEMATOLOGY RELATED FACTORS                                                         [ ] Prior episodes of VTE                                     (3 Points)                     [ ] Positive family history for VTE                      (3 Points)                  [ ] Prothrombin 29142 A                                     (3 Points)                     [ ] Factor V Leiden                                                (3 Points)                        [ ] Lupus anticoagulants                                      (3 Points)                                                           [ ] Anticardiolipin antibodies                              (3 Points)                                                       [ ] High homocysteine in the blood                   (3 Points)                                             [ ] Other congenital or acquired thrombophilia      (3 Points)                                                [ ] Heparin induced thrombocytopenia                  (3 Points)                                        MOBILITY RELATED FACTORS  [ ] Bed rest                                                         (1 Point)  [ ] Plaster cast                                                    (2 points)  [ ] Bed bound for more than 72 hours           (2 Points)    GENDER SPECIFIC FACTORS  [ ] Pregnancy or had a baby within the last month   (1 Point)  [ ] Post-partum < 6 weeks                                   (1 Point)  [ ] Hormonal therapy  or oral contraception   (1 Point)  [ ] History of pregnancy complications              (1 point)  [ ] Unexplained or recurrent              (1 Point)    OTHER RISK FACTORS                                           (1 Point)  [ ] BMI >40, smoking, diabetes requiring insulin, chemotherapy  blood transfusions and length of surgery over 2 hours    SURGERY RELATED RISK FACTORS  [ ]  Section within the last month     (1 Point)  [x ] Minor surgery                                                  (1 Point)  [ ] Arthroscopic surgery                                       (2 Points)  [ ] Planned major surgery lasting more            (2 Points)      than 45 minutes     [ ] Elective hip or knee joint replacement       (5 points)       surgery                                                TRAUMA RELATED RISK FACTORS  [ ] Fracture of the hip, pelvis, or leg                       (5 Points)  [ ] Spinal cord injury resulting in paralysis             (5 points)       (in the previous month)    [ ] Paralysis  (less than 1 month)                             (5 Points)  [ ] Multiple Trauma within 1 month                        (5 Points)    Total Score [  2      ]    Caprini Score 0-2: Low Risk, NO VTE prophylaxis required for most patients, encourage ambulation  Caprini Score 3-6: Moderate Risk , pharmacologic VTE prophylaxis is indicated for most patients (in the absence of contraindications)  Caprini Score Greater than or =7: High risk, pharmocologic VTE prophylaxis indicated for most patients (in the absence of contraindications)    OPIOID RISK TOOL    CHAPARRO EACH BOX THAT APPLIES AND ADD TOTALS AT THE END    FAMILY HISTORY OF SUBSTANCE ABUSE                 FEMALE         MALE                                                Alcohol                             [  ]1 pt          [  ]3pts                                               Illegal Durgs                     [  ]2 pts        [  ]3pts                                               Rx Drugs                           [  ]4 pts        [  ]4 pts    PERSONAL HISTORY OF SUBSTANCE ABUSE                                                                                          Alcohol                             [  ]3 pts       [  ]3 pts                                               Illegal Drugs                     [  ]4 pts        [  ]4 pts                                               Rx Drugs                           [  ]5 pts        [  ]5 pts    AGE BETWEEN 16-45 YEARS                                      [x ]1 pt         [  ]1 pt    HISTORY OF PREADOLESCENT   SEXUAL ABUSE                                                             [  ]3 pts        [  ]0pts    PSYCHOLOGICAL DISEASE                     ADD, OCD, Bipolar, Schizophrenia        [  ]2 pts         [  ]2 pts                      Depression                                               [  ]1 pt           [  ]1 pt           SCORING TOTAL   (add numbers and type here)              (*1**)                                     A score of 3 or lower indicated LOW risk for future opioid abuse  A score of 4 to 7 indicated moderate risk for future opioid abuse  A score of 8 or higher indicates a high risk for opioid abuse

## 2022-07-12 NOTE — H&P PST ADULT - BSA (M2)
MRN-[id number]    HPI:  Patient is a 69 yo Rt handed male PMH CLL on Venetoclax, MDS, melanoma, paroxysmal AFib (not on AC) presenting as transfer from [place] for event capture. Patient was initially at  [facility] on 9/14 with confusion, aphasia and R sided weakness. Patient was stroke code and was given s/p tPA. On admission, patient had CTH, CTA, CT perfusion during code stroke protocol with no acute findings. MRI Brain was  performed at  9/16 with no ischemic findings. EEG performed 9/15 showed diffuse L hemispheric slowing. Pt remained aphasic with AMS. +RUE spasticity was noted on exam. Patient was thus transferred to [facility] for EMU admission to evaluate for subclinical seizures.  Of note, patient does not have hx of seizures.        
1.91

## 2022-07-12 NOTE — H&P PST ADULT - HISTORY OF PRESENT ILLNESS
38 y/o female with PMH of elevated liver enzymes and hernia presents to [facility]. Patient states she has been found to have multiple hernias. She is scheduled to have the hernias repair in august but is going to have botox injections done in the abdomen first. Reports she feels she stomach is bloated and distended, denies pain or discomfort. Denies fevers, chills or vomiting. She states she often feels nausea. Patient is scheduled for Botox injection with anesthesia ordered by Dr. Guerra, going to be performed by Dr. Cisneros.  38 y/o female with PMH of elevated liver enzymes and hernia presents to [facility]. Patient states she has been found to have multiple hernias. She is scheduled to have the hernias repair in august but is going to have botox injections done in the abdomen first. Reports she feels she stomach is bloated and distended, denies pain or discomfort. Denies fevers, chills or vomiting. She states she often feels nausea. Patient is scheduled for Botox injection with anesthesia ordered by Dr. Guerra, going to be performed by Dr. Treadwell.

## 2022-07-12 NOTE — H&P PST ADULT - NSICDXFAMILYHX_GEN_ALL_CORE_FT
FAMILY HISTORY:  Father  Still living? Unknown  FH: stomach cancer, Age at diagnosis: Age Unknown    Sibling  Still living? Unknown  FH: brain cancer, Age at diagnosis: Age Unknown  FH: multiple sclerosis, Age at diagnosis: Age Unknown

## 2022-07-12 NOTE — H&P PST ADULT - CARDIOVASCULAR
negative normal/regular rate and rhythm/S1 S2 present/no gallops/no rub/no murmur/no JVD/normal PMI/no pedal edema

## 2022-07-12 NOTE — H&P PST ADULT - PROBLEM SELECTOR PLAN 1
Patient is scheduled for Botox injection with anesthesia ordered by Dr. Guerra, going to be performed by Dr. Cisneros.

## 2022-07-12 NOTE — H&P PST ADULT - MUSCULOSKELETAL COMMENTS
B/L hands and feet occasionally Paramedian Forehead Flap Text: A decision was made to reconstruct the defect utilizing an interpolation axial flap and a staged reconstruction.  A telfa template was made of the defect.  This telfa template was then used to outline the paramedian forehead pedicle flap.  The donor area for the pedicle flap was then injected with anesthesia.  The flap was excised through the skin and subcutaneous tissue down to the layer of the underlying musculature.  The pedicle flap was carefully excised within this deep plane to maintain its blood supply.  The edges of the donor site were undermined.   The donor site was closed in a primary fashion.  The pedicle was then rotated into position and sutured.  Once the tube was sutured into place, adequate blood supply was confirmed with blanching and refill.  The pedicle was then wrapped with xeroform gauze and dressed appropriately with a telfa and gauze bandage to ensure continued blood supply and protect the attached pedicle.

## 2022-07-15 ENCOUNTER — NON-APPOINTMENT (OUTPATIENT)
Age: 37
End: 2022-07-15

## 2022-07-15 ENCOUNTER — APPOINTMENT (OUTPATIENT)
Dept: BARIATRICS/WEIGHT MGMT | Facility: CLINIC | Age: 37
End: 2022-07-15

## 2022-07-16 RX ORDER — ONABOTULINUMTOXINA 100 UNIT
300 VIAL (EA) INJECTION ONCE
Refills: 0 | Status: DISCONTINUED | OUTPATIENT
Start: 2022-07-19 | End: 2022-08-02

## 2022-07-19 ENCOUNTER — RESULT REVIEW (OUTPATIENT)
Age: 37
End: 2022-07-19

## 2022-07-19 ENCOUNTER — TRANSCRIPTION ENCOUNTER (OUTPATIENT)
Age: 37
End: 2022-07-19

## 2022-07-19 ENCOUNTER — OUTPATIENT (OUTPATIENT)
Dept: OUTPATIENT SERVICES | Facility: HOSPITAL | Age: 37
LOS: 1 days | End: 2022-07-19
Payer: MEDICAID

## 2022-07-19 VITALS
WEIGHT: 199.96 LBS | HEIGHT: 62 IN | OXYGEN SATURATION: 99 % | DIASTOLIC BLOOD PRESSURE: 66 MMHG | RESPIRATION RATE: 16 BRPM | SYSTOLIC BLOOD PRESSURE: 110 MMHG | HEART RATE: 70 BPM | TEMPERATURE: 99 F

## 2022-07-19 DIAGNOSIS — Z98.890 OTHER SPECIFIED POSTPROCEDURAL STATES: Chronic | ICD-10-CM

## 2022-07-19 DIAGNOSIS — K43.2 INCISIONAL HERNIA WITHOUT OBSTRUCTION OR GANGRENE: ICD-10-CM

## 2022-07-19 DIAGNOSIS — Z98.891 HISTORY OF UTERINE SCAR FROM PREVIOUS SURGERY: Chronic | ICD-10-CM

## 2022-07-19 LAB — HCG UR QL: NEGATIVE — SIGNIFICANT CHANGE UP

## 2022-07-19 PROCEDURE — 81025 URINE PREGNANCY TEST: CPT

## 2022-07-19 PROCEDURE — 64647 CHEMODENERV TRUNK MUSC 6/>: CPT

## 2022-07-19 PROCEDURE — 76942 ECHO GUIDE FOR BIOPSY: CPT

## 2022-07-19 NOTE — ASU DISCHARGE PLAN (ADULT/PEDIATRIC) - NS MD DC FALL RISK RISK
For information on Fall & Injury Prevention, visit: [email or web address] OR  [email or web address] OR  https://www.cdc.gov/steadi/patient.html

## 2022-07-19 NOTE — ASU DISCHARGE PLAN (ADULT/PEDIATRIC) - ASU DC SPECIAL INSTRUCTIONSFT
Botox Injection Discharge    Discharge Instructions  - You have had botox injected into your abdominal wall.  - Botox can cause muscle weakness and paralysis and if you have any symptoms related to shortness of breath or difficulty breathing, call 911 right away.  - If you experience any abnormal or new abdominal pain, please call the IR physician on call or 911.  - You may shower in 24 hours. No soaking or swimming until the site is completely healed.  - Keep the area covered and dry for the next 24 hours.  - Do not perform any heavy lifting for the next few days or until the site is healed.  - You may resume your normal diet.  - You may resume your normal medications however you should wait 48 hours before restarting aspirin, plavix, or blood thinners.  - It is normal to experience some pain over the site for the next few days. You may take apply ice to the area (20 minutes on, 20 minutes off) and take Tylenol for that pain. Do not take more frequently than every 6 hours and do not exceed more than 3000mg of Tylenol in a 24 hour period.    - You were given conscious sedation which may make you drowsy, therefore you need someone to stay with you until the morning following the procedure.  - Do not drive, engage in heavy lifting or strenuous activity, or drink any alcoholic beverages for the next 24 hours.   - You may resume normal activity in 24 hours.    Notify your primary physician and/or Interventional Radiology IMMEDIATELY if you experience any of the following       - Fever of 101F or 38C       - Chills or Rigors/ Shakes       - Swelling and/or Redness in the area around the biopsy site       - Worsening Pain       - Blood soaked bandages or worsening bleeding       - Lightheadedness and/or dizziness upon standing       - Chest Pain/ Tightness       - Shortness of Breath       - Difficulty walking    If you have a problem that you believe requires IMMEDIATE attention, please go to your NEAREST Emergency Room. If you believe your problem can safely wait until you speak to a physician, please call Interventional Radiology for any concerns.    During Normal Weekday Business Hours- You can contact the Interventional Radiology department during normal business hours via telephone.  During Evenings and Weekends- If you need to contact Interventional Radiology during off hours, do so by calling the hospital and requesting to be connected to the Interventional Radiologist on call.

## 2022-07-19 NOTE — PROGRESS NOTE ADULT - SUBJECTIVE AND OBJECTIVE BOX
IR Post Procedure Note    Diagnosis: Hernia    Procedure: Abdominal botox injection    : Jake Treadwell MD    Contrast: None    Anesthesia: 1% Lidocaine Subcutaneous, Sedation administered by Anesthesiology    Estimated Blood Loss: Less than 10cc    Specimens: None    Complications: No Immediate Complications    Anticoagulation: Resume without Restriction    Findings & Plan: Abdominal botox injection in 6 sites in 3 layers of abdominal muscles      Please call Interventional Radiology with any questions, concerns, or issues.

## 2022-07-19 NOTE — ASU DISCHARGE PLAN (ADULT/PEDIATRIC) - WILL THE PATIENT ACCEPT THE PFIZER COVID-19 VACCINE IF ELIGIBLE AND IT IS AVAILABLE?
[facility]  [address] [place] [address]    Coronary Angiogram (Radial/Ulnar Approach) After Care    Refer to this sheet in the next few weeks. These instructions provide you with information on caring for yourself after your procedure. Your caregiver may also give you more specific instructions. Your treatment has been planned according to current medical practices, but problems sometimes occur. Call your caregiver if you have any problems or questions after your procedure.    Home Care Instructions:  · You may shower the day after the procedure. Remove the bandage (dressing) and gently wash the site with plain soap and water. Gently pat the site dry. You may apply a band aid daily for 2 days if desired.    · Do not apply powder or lotion to the site.  · Do not submerge the affected site in water for 3 to 5 days or until the site is completely healed.   · Do not lift, push or pull anything over 5 pounds for 5 days after your procedure. As a reference, a gallon of milk weighs 8 pounds.   · Inspect the site at least twice daily. You may notice some bruising at the site and it may be tender for 1 to 2 weeks.     · Increase your fluid intake for the next 2 days.    · Keep arm elevated for 24 hours. For the remainder of the day, keep your arm in “Pledge of Allegiance” position when up and about.     · You may drive 24 hours after the procedure unless otherwise instructed by your caregiver.  · Do not operate machinery or power tools for 24 hours.  · A responsible adult should be with you for the first 24 hours after you arrive home. Do not make any important legal decisions or sign legal papers for 24 hours.  Do not drink alcohol for 24 hours.    · Metformin or any medications containing Metformin should not be taken for 48 hours after your procedure.      Call Your Doctor if:   · You have unusual pain at the radial/ulnar (wrist) site.  · You have redness, warmth, swelling, or pain at the  radial/ulnar (wrist) site.  · You have drainage (other than a small amount of blood on the dressing).  · You have chills or a fever > 101.  · Your arm becomes pale or dark, cool, tingly, or numb.  · You have heavy bleeding from the site, hold pressure on the site for 20 minutes.  If the bleeding stops, apply a fresh bandage and call your cardiologist.  However, if you continue to have bleeding, call 911.         Not applicable

## 2022-07-27 PROBLEM — R74.8 ABNORMAL LEVELS OF OTHER SERUM ENZYMES: Chronic | Status: ACTIVE | Noted: 2022-07-12

## 2022-07-29 ENCOUNTER — OUTPATIENT (OUTPATIENT)
Dept: OUTPATIENT SERVICES | Facility: HOSPITAL | Age: 37
LOS: 1 days | End: 2022-07-29

## 2022-07-29 VITALS
TEMPERATURE: 97 F | SYSTOLIC BLOOD PRESSURE: 107 MMHG | RESPIRATION RATE: 16 BRPM | HEART RATE: 68 BPM | DIASTOLIC BLOOD PRESSURE: 68 MMHG | OXYGEN SATURATION: 94 % | WEIGHT: 201.5 LBS | HEIGHT: 62 IN

## 2022-07-29 DIAGNOSIS — Z98.891 HISTORY OF UTERINE SCAR FROM PREVIOUS SURGERY: Chronic | ICD-10-CM

## 2022-07-29 DIAGNOSIS — Z98.890 OTHER SPECIFIED POSTPROCEDURAL STATES: Chronic | ICD-10-CM

## 2022-07-29 DIAGNOSIS — Z01.818 ENCOUNTER FOR OTHER PREPROCEDURAL EXAMINATION: ICD-10-CM

## 2022-07-29 DIAGNOSIS — K43.2 INCISIONAL HERNIA WITHOUT OBSTRUCTION OR GANGRENE: ICD-10-CM

## 2022-07-29 DIAGNOSIS — Z29.9 ENCOUNTER FOR PROPHYLACTIC MEASURES, UNSPECIFIED: ICD-10-CM

## 2022-07-29 LAB
A1C WITH ESTIMATED AVERAGE GLUCOSE RESULT: 5.1 % — SIGNIFICANT CHANGE UP (ref 4–5.6)
ANION GAP SERPL CALC-SCNC: 8 MMOL/L — SIGNIFICANT CHANGE UP (ref 5–17)
APTT BLD: 31 SEC — SIGNIFICANT CHANGE UP (ref 27.5–35.5)
BLD GP AB SCN SERPL QL: SIGNIFICANT CHANGE UP
BUN SERPL-MCNC: 12.1 MG/DL — SIGNIFICANT CHANGE UP (ref 8–20)
CALCIUM SERPL-MCNC: 9 MG/DL — SIGNIFICANT CHANGE UP (ref 8.4–10.5)
CHLORIDE SERPL-SCNC: 101 MMOL/L — SIGNIFICANT CHANGE UP (ref 98–107)
CO2 SERPL-SCNC: 28 MMOL/L — SIGNIFICANT CHANGE UP (ref 22–29)
CREAT SERPL-MCNC: 0.55 MG/DL — SIGNIFICANT CHANGE UP (ref 0.5–1.3)
EGFR: 123 ML/MIN/1.73M2 — SIGNIFICANT CHANGE UP
ESTIMATED AVERAGE GLUCOSE: 100 MG/DL — SIGNIFICANT CHANGE UP (ref 68–114)
GLUCOSE SERPL-MCNC: 94 MG/DL — SIGNIFICANT CHANGE UP (ref 70–99)
HCT VFR BLD CALC: 39.1 % — SIGNIFICANT CHANGE UP (ref 34.5–45)
HGB BLD-MCNC: 13.2 G/DL — SIGNIFICANT CHANGE UP (ref 11.5–15.5)
INR BLD: 1.11 RATIO — SIGNIFICANT CHANGE UP (ref 0.88–1.16)
MCHC RBC-ENTMCNC: 29.9 PG — SIGNIFICANT CHANGE UP (ref 27–34)
MCHC RBC-ENTMCNC: 33.8 GM/DL — SIGNIFICANT CHANGE UP (ref 32–36)
MCV RBC AUTO: 88.5 FL — SIGNIFICANT CHANGE UP (ref 80–100)
MRSA PCR RESULT.: SIGNIFICANT CHANGE UP
PLATELET # BLD AUTO: 391 K/UL — SIGNIFICANT CHANGE UP (ref 150–400)
POTASSIUM SERPL-MCNC: 4.1 MMOL/L — SIGNIFICANT CHANGE UP (ref 3.5–5.3)
POTASSIUM SERPL-SCNC: 4.1 MMOL/L — SIGNIFICANT CHANGE UP (ref 3.5–5.3)
PROTHROM AB SERPL-ACNC: 12.9 SEC — SIGNIFICANT CHANGE UP (ref 10.5–13.4)
RBC # BLD: 4.42 M/UL — SIGNIFICANT CHANGE UP (ref 3.8–5.2)
RBC # FLD: 13.2 % — SIGNIFICANT CHANGE UP (ref 10.3–14.5)
S AUREUS DNA NOSE QL NAA+PROBE: SIGNIFICANT CHANGE UP
SODIUM SERPL-SCNC: 137 MMOL/L — SIGNIFICANT CHANGE UP (ref 135–145)
WBC # BLD: 10.2 K/UL — SIGNIFICANT CHANGE UP (ref 3.8–10.5)
WBC # FLD AUTO: 10.2 K/UL — SIGNIFICANT CHANGE UP (ref 3.8–10.5)

## 2022-07-29 RX ORDER — CEFAZOLIN SODIUM 1 G
2000 VIAL (EA) INJECTION ONCE
Refills: 0 | Status: DISCONTINUED | OUTPATIENT
Start: 2022-08-16 | End: 2022-08-18

## 2022-07-29 RX ORDER — IBUPROFEN 200 MG
2 TABLET ORAL
Qty: 0 | Refills: 0 | DISCHARGE

## 2022-07-29 NOTE — H&P PST ADULT - ATTENDING COMMENTS
Plan for open retrorectus incisional hernia repair with mesh, possible component separation.  Risks/benefits discussed with patient. Pt understands, agrees, and wishes to proceed. All questions answered.

## 2022-07-29 NOTE — H&P PST ADULT - HISTORY OF PRESENT ILLNESS
37 year old female with incisional hernia  she states that she had a retroperitoneal mass which was removed in 2021(Lipoma) and since then she developed the hernis and has dyscomfort 37 year old woman with history of open excision of retroperitoneal tumor (4/14/21) at [place] states that she developed a incisional hernia, she  reports intermittent pain, especially at end of day and with activity. Denies fever/chills/nausea/emesis or changes in bowel or bladder habits. Tolerating diet. Normal bowel movements. She is scheduled for a Open retrorectus incisional hernia repair with mesh possible compartment separation by Dr. Guerra on 8/16/22. Covid vaccine series completed, card in chart, covid test is on 8/12/22. (Toni Melgar  [place] head [phone] (she has an apnt as per surgeon, but no Medical Clearance required )

## 2022-07-29 NOTE — H&P PST ADULT - ASSESSMENT
37 year old woman with history of open excision of retroperitoneal tumor (21) at [place] states that she developed a incisional hernia, she  reports intermittent pain, especially at end of day and with activity. Denies fever/chills/nausea/emesis or changes in bowel or bladder habits. Tolerating diet. Normal bowel movements. She is scheduled for a Open retrorectus incisional hernia repair with mesh possible compartment separation by Dr. Guerra on 22. Covid vaccine series completed, card in chart, covid test is on 22. (Toni Perla head [phone] (she has an apnt as requested by surgeon, but no Medical Clearance required )  she is not on any meds. Asked the pt not to take any NSAID's 5-7 days before surgery and told the pt Tylenol is okay to take for pain, pt verbalized understanding. pt is not taking ASA/Plavix/Anticoagulation medication at this time.     CAPRINI VTE 2.0 SCORE [CLOT updated 2019]    AGE RELATED RISK FACTORS                                                       MOBILITY RELATED FACTORS  [ ] Age 41-60 years                                            (1 Point)                    [ ] Bed rest                                                        (1 Point)  [ ] Age: 61-74 years                                           (2 Points)                  [ ] Plaster cast                                                   (2 Points)  [ ] Age= 75 years                                              (3 Points)                    [ ] Bed bound for more than 72 hours                 (2 Points)    DISEASE RELATED RISK FACTORS                                               GENDER SPECIFIC FACTORS  [ ] Edema in the lower extremities                       (1 Point)              [ ] Pregnancy                                                     (1 Point)  [ ] Varicose veins                                               (1 Point)                     [ ] Post-partum < 6 weeks                                   (1 Point)             [ x] BMI > 25 Kg/m2                                            (1 Point)                     [ ] Hormonal therapy  or oral contraception          (1 Point)                 [ ] Sepsis (in the previous month)                        (1 Point)               [ ] History of pregnancy complications                 (1 point)  [ ] Pneumonia or serious lung disease                                               [ ] Unexplained or recurrent                     (1 Point)           (in the previous month)                               (1 Point)  [ ] Abnormal pulmonary function test                     (1 Point)                 SURGERY RELATED RISK FACTORS  [ ] Acute myocardial infarction                              (1 Point)               [ ]  Section                                             (1 Point)  [ ] Congestive heart failure (in the previous month)  (1 Point)      [ ] Minor surgery                                                  (1 Point)   [ ] Inflammatory bowel disease                             (1 Point)               [ ] Arthroscopic surgery                                        (2 Points)  [ ] Central venous access                                      (2 Points)                [x ] General surgery lasting more than 45 minutes (2 points)  [ ] Malignancy- Present or previous                   (2 Points)                [ ] Elective arthroplasty                                         (5 points)    [ ] Stroke (in the previous month)                          (5 Points)                                                                                                                                                           HEMATOLOGY RELATED FACTORS                                                 TRAUMA RELATED RISK FACTORS  [ ] Prior episodes of VTE                                     (3 Points)                [ ] Fracture of the hip, pelvis, or leg                       (5 Points)  [ ] Positive family history for VTE                         (3 Points)             [ ] Acute spinal cord injury (in the previous month)  (5 Points)  [ ] Prothrombin 24973 A                                     (3 Points)               [ ] Paralysis  (less than 1 month)                             (5 Points)  [ ] Factor V Leiden                                             (3 Points)                  [ ] Multiple Trauma within 1 month                        (5 Points)  [ ] Lupus anticoagulants                                     (3 Points)                                                           [ ] Anticardiolipin antibodies                               (3 Points)                                                       [ ] High homocysteine in the blood                      (3 Points)                                             [ ] Other congenital or acquired thrombophilia      (3 Points)                                                [ ] Heparin induced thrombocytopenia                  (3 Points)                                     Total Score [    3      ]  OPIOID RISK TOOL    [place] EACH BOX THAT APPLIES AND ADD TOTALS AT THE END    FAMILY HISTORY OF SUBSTANCE ABUSE                 FEMALE         MALE                                                Alcohol                             [  ]1 pt          [  ]3pts                                               Illegal Drugs                     [  ]2 pts        [  ]3pts                                               Rx Drugs                           [  ]4 pts        [  ]4 pts    PERSONAL HISTORY OF SUBSTANCE ABUSE                                                                                          Alcohol                             [  ]3 pts       [  ]3 pts                                               Illegal Drugs                     [  ]4 pts        [  ]4 pts                                               Rx Drugs                           [  ]5 pts        [  ]5 pts    AGE BETWEEN 16-45 YEARS                                      [  ]1 pt         [  ]1 pt    HISTORY OF PREADOLESCENT   SEXUAL ABUSE                                                             [  ]3 pts        [  ]0pts    PSYCHOLOGICAL DISEASE                     ADD, OCD, Bipolar, Schizophrenia        [  ]2 pts         [  ]2 pts                      Depression                                               [  ]1 pt           [  ]1 pt           SCORING TOTAL   (add numbers and type here)              ( 0)                                     A score of 3 or lower indicated LOW risk for future opioid abuse  A score of 4 to 7 indicated moderate risk for future opioid abuse  A score of 8 or higher indicates a high risk for opioid abuse

## 2022-07-29 NOTE — H&P PST ADULT - PROBLEM SELECTOR PLAN 2
Open retrorectus incisional hernia repair with mesh possible compartment separation by Dr. Guerra on 8/16/22. Covid vaccine series completed, card in chart, covid test is on 8/12/22. (Toni Melgar  [place] head [phone] (she has an apnt as requested by surgeon, but no Medical Clearance required )

## 2022-08-03 ENCOUNTER — APPOINTMENT (OUTPATIENT)
Dept: BARIATRICS/WEIGHT MGMT | Facility: CLINIC | Age: 37
End: 2022-08-03

## 2022-08-15 ENCOUNTER — TRANSCRIPTION ENCOUNTER (OUTPATIENT)
Age: 37
End: 2022-08-15

## 2022-08-16 ENCOUNTER — RESULT REVIEW (OUTPATIENT)
Age: 37
End: 2022-08-16

## 2022-08-16 ENCOUNTER — APPOINTMENT (OUTPATIENT)
Dept: SURGERY | Facility: HOSPITAL | Age: 37
End: 2022-08-16

## 2022-08-16 ENCOUNTER — TRANSCRIPTION ENCOUNTER (OUTPATIENT)
Age: 37
End: 2022-08-16

## 2022-08-16 ENCOUNTER — INPATIENT (INPATIENT)
Facility: HOSPITAL | Age: 37
LOS: 1 days | Discharge: HOME | DRG: 355 | End: 2022-08-18
Attending: SURGERY | Admitting: SURGERY
Payer: MEDICAID

## 2022-08-16 VITALS
WEIGHT: 201.5 LBS | DIASTOLIC BLOOD PRESSURE: 77 MMHG | RESPIRATION RATE: 16 BRPM | HEART RATE: 77 BPM | OXYGEN SATURATION: 100 % | SYSTOLIC BLOOD PRESSURE: 127 MMHG | HEIGHT: 63 IN | TEMPERATURE: 99 F

## 2022-08-16 DIAGNOSIS — Z98.890 OTHER SPECIFIED POSTPROCEDURAL STATES: Chronic | ICD-10-CM

## 2022-08-16 DIAGNOSIS — Z98.891 HISTORY OF UTERINE SCAR FROM PREVIOUS SURGERY: Chronic | ICD-10-CM

## 2022-08-16 DIAGNOSIS — K43.2 INCISIONAL HERNIA WITHOUT OBSTRUCTION OR GANGRENE: ICD-10-CM

## 2022-08-16 LAB — ABO RH CONFIRMATION: SIGNIFICANT CHANGE UP

## 2022-08-16 PROCEDURE — 49561: CPT

## 2022-08-16 PROCEDURE — 88304 TISSUE EXAM BY PATHOLOGIST: CPT | Mod: 26

## 2022-08-16 PROCEDURE — 15734 MUSCLE-SKIN GRAFT TRUNK: CPT

## 2022-08-16 PROCEDURE — 49568: CPT

## 2022-08-16 PROCEDURE — 14041 TIS TRNFR F/C/C/M/N/A/G/H/F: CPT

## 2022-08-16 PROCEDURE — 88302 TISSUE EXAM BY PATHOLOGIST: CPT | Mod: 26

## 2022-08-16 DEVICE — MESH SURG SYNECOR PREPERITONEAL 30X20CM: Type: IMPLANTABLE DEVICE | Status: FUNCTIONAL

## 2022-08-16 RX ORDER — CELECOXIB 200 MG/1
400 CAPSULE ORAL ONCE
Refills: 0 | Status: COMPLETED | OUTPATIENT
Start: 2022-08-16 | End: 2022-08-16

## 2022-08-16 RX ORDER — SODIUM CHLORIDE 9 MG/ML
3 INJECTION INTRAMUSCULAR; INTRAVENOUS; SUBCUTANEOUS EVERY 8 HOURS
Refills: 0 | Status: DISCONTINUED | OUTPATIENT
Start: 2022-08-16 | End: 2022-08-16

## 2022-08-16 RX ORDER — CEFAZOLIN SODIUM 1 G
2000 VIAL (EA) INJECTION EVERY 8 HOURS
Refills: 0 | Status: COMPLETED | OUTPATIENT
Start: 2022-08-16 | End: 2022-08-17

## 2022-08-16 RX ORDER — ACETAMINOPHEN 500 MG
975 TABLET ORAL ONCE
Refills: 0 | Status: COMPLETED | OUTPATIENT
Start: 2022-08-16 | End: 2022-08-16

## 2022-08-16 RX ORDER — DIPHENHYDRAMINE HCL 50 MG
25 CAPSULE ORAL EVERY 4 HOURS
Refills: 0 | Status: DISCONTINUED | OUTPATIENT
Start: 2022-08-16 | End: 2022-08-18

## 2022-08-16 RX ORDER — ONDANSETRON 8 MG/1
4 TABLET, FILM COATED ORAL ONCE
Refills: 0 | Status: DISCONTINUED | OUTPATIENT
Start: 2022-08-16 | End: 2022-08-16

## 2022-08-16 RX ORDER — NALOXONE HYDROCHLORIDE 4 MG/.1ML
0.1 SPRAY NASAL
Refills: 0 | Status: DISCONTINUED | OUTPATIENT
Start: 2022-08-16 | End: 2022-08-18

## 2022-08-16 RX ORDER — ACETAMINOPHEN 500 MG
1000 TABLET ORAL EVERY 6 HOURS
Refills: 0 | Status: COMPLETED | OUTPATIENT
Start: 2022-08-16 | End: 2022-08-17

## 2022-08-16 RX ORDER — SODIUM CHLORIDE 9 MG/ML
1000 INJECTION, SOLUTION INTRAVENOUS
Refills: 0 | Status: DISCONTINUED | OUTPATIENT
Start: 2022-08-16 | End: 2022-08-17

## 2022-08-16 RX ORDER — ONDANSETRON 8 MG/1
4 TABLET, FILM COATED ORAL EVERY 6 HOURS
Refills: 0 | Status: DISCONTINUED | OUTPATIENT
Start: 2022-08-16 | End: 2022-08-18

## 2022-08-16 RX ORDER — POLYETHYLENE GLYCOL 3350 17 G/17G
17 POWDER, FOR SOLUTION ORAL
Refills: 0 | Status: DISCONTINUED | OUTPATIENT
Start: 2022-08-16 | End: 2022-08-18

## 2022-08-16 RX ORDER — HYDROMORPHONE HYDROCHLORIDE 2 MG/ML
0.5 INJECTION INTRAMUSCULAR; INTRAVENOUS; SUBCUTANEOUS
Refills: 0 | Status: DISCONTINUED | OUTPATIENT
Start: 2022-08-16 | End: 2022-08-16

## 2022-08-16 RX ORDER — ACETAMINOPHEN 500 MG
2 TABLET ORAL
Qty: 0 | Refills: 0 | DISCHARGE

## 2022-08-16 RX ORDER — BUPIVACAINE 13.3 MG/ML
20 INJECTION, SUSPENSION, LIPOSOMAL INFILTRATION ONCE
Refills: 0 | Status: DISCONTINUED | OUTPATIENT
Start: 2022-08-16 | End: 2022-08-16

## 2022-08-16 RX ORDER — FENTANYL CITRATE 50 UG/ML
25 INJECTION INTRAVENOUS
Refills: 0 | Status: DISCONTINUED | OUTPATIENT
Start: 2022-08-16 | End: 2022-08-16

## 2022-08-16 RX ORDER — FENTANYL/BUPIVACAINE/NS/PF 2MCG/ML-.1
250 PLASTIC BAG, INJECTION (ML) INJECTION
Refills: 0 | Status: DISCONTINUED | OUTPATIENT
Start: 2022-08-16 | End: 2022-08-17

## 2022-08-16 RX ORDER — KETOROLAC TROMETHAMINE 30 MG/ML
15 SYRINGE (ML) INJECTION EVERY 6 HOURS
Refills: 0 | Status: DISCONTINUED | OUTPATIENT
Start: 2022-08-16 | End: 2022-08-17

## 2022-08-16 RX ORDER — DEXAMETHASONE 0.5 MG/5ML
4 ELIXIR ORAL EVERY 6 HOURS
Refills: 0 | Status: DISCONTINUED | OUTPATIENT
Start: 2022-08-16 | End: 2022-08-18

## 2022-08-16 RX ORDER — HEPARIN SODIUM 5000 [USP'U]/ML
5000 INJECTION INTRAVENOUS; SUBCUTANEOUS ONCE
Refills: 0 | Status: COMPLETED | OUTPATIENT
Start: 2022-08-16 | End: 2022-08-16

## 2022-08-16 RX ADMIN — Medication 250 MILLILITER(S): at 19:14

## 2022-08-16 RX ADMIN — Medication 400 MILLIGRAM(S): at 21:24

## 2022-08-16 RX ADMIN — CELECOXIB 400 MILLIGRAM(S): 200 CAPSULE ORAL at 11:16

## 2022-08-16 RX ADMIN — Medication 250 MILLILITER(S): at 20:52

## 2022-08-16 RX ADMIN — Medication 250 MILLILITER(S): at 17:33

## 2022-08-16 RX ADMIN — SODIUM CHLORIDE 3 MILLILITER(S): 9 INJECTION INTRAMUSCULAR; INTRAVENOUS; SUBCUTANEOUS at 11:33

## 2022-08-16 RX ADMIN — Medication 100 MILLIGRAM(S): at 21:27

## 2022-08-16 RX ADMIN — Medication 25 MILLIGRAM(S): at 21:27

## 2022-08-16 RX ADMIN — Medication 1000 MILLIGRAM(S): at 22:39

## 2022-08-16 RX ADMIN — Medication 975 MILLIGRAM(S): at 11:16

## 2022-08-16 NOTE — DISCHARGE NOTE PROVIDER - PROVIDER TOKENS
PROVIDER:[TOKEN:[[id number]],FOLLOWUP:[2 weeks]],PROVIDER:[TOKEN:[[id number]],SCHEDULEDAPPT:[08/22/2022]]

## 2022-08-16 NOTE — CHART NOTE - NSCHARTNOTEFT_GEN_A_CORE
POST-OPERATIVE NOTE  Patient evaluated at 0920    Subjective: Patient has an epidural running in place, states pain is controlled, tolerated clears, doing IS, afebrile, monroy draining clear urine  Patient is s/p Open retrorectus hernia repair with mesh with a subcutaneous drain, binder in place POD 0.     Vital Signs Last 24 Hrs  T(C): 36.6 (16 Aug 2022 20:20), Max: 37.1 (16 Aug 2022 10:56)  T(F): 97.8 (16 Aug 2022 20:20), Max: 98.7 (16 Aug 2022 10:56)  HR: 87 (16 Aug 2022 20:20) (77 - 97)  BP: 115/72 (16 Aug 2022 20:20) (113/60 - 127/77)  BP(mean): 73 (16 Aug 2022 17:45) (71 - 78)  RR: 17 (16 Aug 2022 20:20) (14 - 19)  SpO2: 96% (16 Aug 2022 20:20) (96% - 100%)    Parameters below as of 16 Aug 2022 20:20  Patient On (Oxygen Delivery Method): nasal cannula  O2 Flow (L/min): 2    I&O's Detail    16 Aug 2022 07:01  -  16 Aug 2022 23:20  --------------------------------------------------------  IN:  Total IN: 0 mL    OUT:    Bulb (mL): 25 mL    Indwelling Catheter - Urethral (mL): 495 mL  Total OUT: 520 mL    Total NET: -520 mL        ceFAZolin   IVPB 2000  ceFAZolin   IVPB 2000  ceFAZolin   IVPB 2000  ceFAZolin   IVPB 2000  heparin SubCutaneous Injection - Peds 5000    PAST MEDICAL & SURGICAL HISTORY:  Elevated liver enzymes      H/O  section        S/P excision of lipoma  , retroperitoneal            Physical Exam:  General: NAD, resting comfortably in bed  Pulmonary: Nonlabored breathing, no respiratory distress on RA, IS 1100  Cardiovascular: NSR  Abdominal: soft, appropriatley tender, prevena with good seal, drain with serosanguinous fluid, binder in place  Extremities: WWP    LABS:            CAPILLARY BLOOD GLUCOSE          Radiology and Additional Studies:    Assessment:  The patient is a 37y Female who is now several hours post-op from a Open retrorectus hernia repair with mesh. doing well pain controlled, with epidural in place,     Plan:  - Pain control epidural in place, plan to remove in AM and perform early AM TAP block  - Monroy out when epidural out  - tylenol toradol  - bowel reg  - CLD will ADAT in AM  - DVT ppx, will holdheparin 6 hours prior to removal  - OOB and ambulating as tolerated  - F/u AM labs

## 2022-08-16 NOTE — DISCHARGE NOTE PROVIDER - CARE PROVIDER_API CALL
Michael Guerra (MD)  Surgery  Bariatric  [address], [place] [id number]  Phone: [phone]  Fax: [phone]  Follow Up Time: 2 weeks    Jp Bledsoe)  Surgery  [address] [facility], [address] [place] [address]  Phone: [phone]  Fax: [phone]  Scheduled Appointment: 08/22/2022

## 2022-08-16 NOTE — DISCHARGE NOTE PROVIDER - HOSPITAL COURSE
HPI:  37 year old woman with history of open excision of retroperitoneal tumor (4/14/21) at [place] states that she developed a incisional hernia, she  reports intermittent pain, especially at end of day and with activity. Denies fever/chills/nausea/emesis or changes in bowel or bladder habits. Tolerating diet. Normal bowel movements. She is scheduled for a Open retrorectus incisional hernia repair with mesh possible compartment separation by Dr. Guerra on 8/16/22. Covid vaccine series completed, card in chart, covid test is on 8/12/22. (Toni Perla head [phone] (she has an apnt as per surgeon, but no Medical Clearance required )    Hospital Course:  Presented on 8/16/22 for an elective incisional hernia repair. Underwent and open ventral hernia repair with mesh placement and surgical drain left in subcutaneous space. Post-operative course notable for pain requiring pain management consult and TAP Block performed on POD 1. By POD2, pain was better controlled, p[patient was tolerating a diet, voiding, and able to ambulate. Cleared for discharge home. Will discharge with drain given continued output. Will follow-up with Dr. Bledsoe 8/22, then with Dr. Guerra in 2 weeks.

## 2022-08-16 NOTE — DISCHARGE NOTE PROVIDER - NSDCCPTREATMENT_GEN_ALL_CORE_FT
PRINCIPAL PROCEDURE  Procedure: Incisional hernia repair  Findings and Treatment: Follow up: Please call and make an appointment with Dr Bledsoe on monday 8/22 and  on 16 days after discharge for staple removal. Also, please call and make an appointment with your primary care physician as per your usual schedule.   Activity: May return to normal activities as tolerated, however refrain from heavy lifting > 10-15 lbs.  Diet: May continue regular diet. you will have stool softener for 6 weeks, to aim for 1 to 2 soft bowel movements a day  Medications: Please take all medications listed on your discharge paperwork as prescribed.   You are encouraged to take over-the-counter tylenol and/or ibuprofen for pain relief   Wound Care: Please, keep wound site clean and dry. You may shower, but do not bathe. You will have a drain, please empty daily and bring a log of daily output to clinic  Patient is advised to RETURN TO THE EMERGENCY DEPARTMENT for any of the following - worsening pain, fever/chills, nausea/vomiting, altered mental status, chest pain, shortness of breath, or any other new / worsening symptom.         PRINCIPAL PROCEDURE  Procedure: Incisional hernia repair  Findings and Treatment: Follow up: Please call and make an appointment with Dr Bledsoe on monday 8/22 and  on 16 days after discharge for staple removal. Also, please call and make an appointment with your primary care physician as per your usual schedule.   Activity: May return to normal activities as tolerated, however refrain from heavy lifting > 10-15 lbs.  Diet: May continue regular diet. you will have to take  stool softeners for 6 weeks, to aim for 1 to 2 soft bowel movements a day  Medications: Please take all medications listed on your discharge paperwork as prescribed.   You are encouraged to take over-the-counter tylenol and/or ibuprofen for pain relief.  Wound Care: Please, keep wound site clean and dry. You may shower, but do not bathe. You will have a drain, please empty daily and bring a log of daily output to clinic  Patient is advised to RETURN TO THE EMERGENCY DEPARTMENT for any of the following - worsening pain, fever/chills, nausea/vomiting, altered mental status, chest pain, shortness of breath, or any other new / worsening symptom.

## 2022-08-16 NOTE — BRIEF OPERATIVE NOTE - OPERATION/FINDINGS
ventral hernia with multiple defects along midline biggest 9cm width. incision carried down to peritoneum posterior sheath disected bilaterally, aproximated with 0 PDS Synecor ([place]) mesh used, flat, abnterior sheath closed without tension ) PDS 0 fascia closed, subq malia, skin staples, prevena ventral hernia with multiple defects along midline. incision carried down to peritoneum posterior sheath disected bilaterally, aproximated with 0 PDS Synecor ([place]) mesh used, flat, abnterior sheath closed without tension ) PDS 0 fascia closed, subq malia, skin staples, prevena

## 2022-08-17 LAB
ANION GAP SERPL CALC-SCNC: 9 MMOL/L — SIGNIFICANT CHANGE UP (ref 5–17)
BASOPHILS # BLD AUTO: 0.03 K/UL — SIGNIFICANT CHANGE UP (ref 0–0.2)
BASOPHILS NFR BLD AUTO: 0.2 % — SIGNIFICANT CHANGE UP (ref 0–2)
BUN SERPL-MCNC: 9.6 MG/DL — SIGNIFICANT CHANGE UP (ref 8–20)
CALCIUM SERPL-MCNC: 8.5 MG/DL — SIGNIFICANT CHANGE UP (ref 8.4–10.5)
CHLORIDE SERPL-SCNC: 103 MMOL/L — SIGNIFICANT CHANGE UP (ref 98–107)
CO2 SERPL-SCNC: 26 MMOL/L — SIGNIFICANT CHANGE UP (ref 22–29)
CREAT SERPL-MCNC: 0.65 MG/DL — SIGNIFICANT CHANGE UP (ref 0.5–1.3)
EGFR: 118 ML/MIN/1.73M2 — SIGNIFICANT CHANGE UP
EOSINOPHIL # BLD AUTO: 0 K/UL — SIGNIFICANT CHANGE UP (ref 0–0.5)
EOSINOPHIL NFR BLD AUTO: 0 % — SIGNIFICANT CHANGE UP (ref 0–6)
GLUCOSE SERPL-MCNC: 133 MG/DL — HIGH (ref 70–99)
HCT VFR BLD CALC: 35.4 % — SIGNIFICANT CHANGE UP (ref 34.5–45)
HCT VFR BLD CALC: 35.7 % — SIGNIFICANT CHANGE UP (ref 34.5–45)
HGB BLD-MCNC: 11.8 G/DL — SIGNIFICANT CHANGE UP (ref 11.5–15.5)
HGB BLD-MCNC: 11.8 G/DL — SIGNIFICANT CHANGE UP (ref 11.5–15.5)
IMM GRANULOCYTES NFR BLD AUTO: 0.3 % — SIGNIFICANT CHANGE UP (ref 0–1.5)
LYMPHOCYTES # BLD AUTO: 1.52 K/UL — SIGNIFICANT CHANGE UP (ref 1–3.3)
LYMPHOCYTES # BLD AUTO: 9.3 % — LOW (ref 13–44)
MAGNESIUM SERPL-MCNC: 2.3 MG/DL — SIGNIFICANT CHANGE UP (ref 1.6–2.6)
MCHC RBC-ENTMCNC: 29.4 PG — SIGNIFICANT CHANGE UP (ref 27–34)
MCHC RBC-ENTMCNC: 29.7 PG — SIGNIFICANT CHANGE UP (ref 27–34)
MCHC RBC-ENTMCNC: 33.1 GM/DL — SIGNIFICANT CHANGE UP (ref 32–36)
MCHC RBC-ENTMCNC: 33.3 GM/DL — SIGNIFICANT CHANGE UP (ref 32–36)
MCV RBC AUTO: 89 FL — SIGNIFICANT CHANGE UP (ref 80–100)
MCV RBC AUTO: 89.2 FL — SIGNIFICANT CHANGE UP (ref 80–100)
MONOCYTES # BLD AUTO: 1.08 K/UL — HIGH (ref 0–0.9)
MONOCYTES NFR BLD AUTO: 6.6 % — SIGNIFICANT CHANGE UP (ref 2–14)
NEUTROPHILS # BLD AUTO: 13.67 K/UL — HIGH (ref 1.8–7.4)
NEUTROPHILS NFR BLD AUTO: 83.6 % — HIGH (ref 43–77)
PHOSPHATE SERPL-MCNC: 3.3 MG/DL — SIGNIFICANT CHANGE UP (ref 2.4–4.7)
PLATELET # BLD AUTO: 338 K/UL — SIGNIFICANT CHANGE UP (ref 150–400)
PLATELET # BLD AUTO: 367 K/UL — SIGNIFICANT CHANGE UP (ref 150–400)
POTASSIUM SERPL-MCNC: 4.2 MMOL/L — SIGNIFICANT CHANGE UP (ref 3.5–5.3)
POTASSIUM SERPL-SCNC: 4.2 MMOL/L — SIGNIFICANT CHANGE UP (ref 3.5–5.3)
RBC # BLD: 3.97 M/UL — SIGNIFICANT CHANGE UP (ref 3.8–5.2)
RBC # BLD: 4.01 M/UL — SIGNIFICANT CHANGE UP (ref 3.8–5.2)
RBC # FLD: 13.6 % — SIGNIFICANT CHANGE UP (ref 10.3–14.5)
RBC # FLD: 13.8 % — SIGNIFICANT CHANGE UP (ref 10.3–14.5)
SODIUM SERPL-SCNC: 138 MMOL/L — SIGNIFICANT CHANGE UP (ref 135–145)
WBC # BLD: 15.79 K/UL — HIGH (ref 3.8–10.5)
WBC # BLD: 16.35 K/UL — HIGH (ref 3.8–10.5)
WBC # FLD AUTO: 15.79 K/UL — HIGH (ref 3.8–10.5)
WBC # FLD AUTO: 16.35 K/UL — HIGH (ref 3.8–10.5)

## 2022-08-17 RX ORDER — HEPARIN SODIUM 5000 [USP'U]/ML
5000 INJECTION INTRAVENOUS; SUBCUTANEOUS EVERY 12 HOURS
Refills: 0 | Status: DISCONTINUED | OUTPATIENT
Start: 2022-08-17 | End: 2022-08-18

## 2022-08-17 RX ORDER — KETOROLAC TROMETHAMINE 30 MG/ML
15 SYRINGE (ML) INJECTION EVERY 6 HOURS
Refills: 0 | Status: DISCONTINUED | OUTPATIENT
Start: 2022-08-17 | End: 2022-08-18

## 2022-08-17 RX ORDER — BUPIVACAINE HCL/PF 7.5 MG/ML
20 VIAL (ML) INJECTION ONCE
Refills: 0 | Status: COMPLETED | OUTPATIENT
Start: 2022-08-17 | End: 2022-08-17

## 2022-08-17 RX ORDER — BUPIVACAINE 13.3 MG/ML
20 INJECTION, SUSPENSION, LIPOSOMAL INFILTRATION ONCE
Refills: 0 | Status: COMPLETED | OUTPATIENT
Start: 2022-08-17 | End: 2022-08-17

## 2022-08-17 RX ORDER — ACETAMINOPHEN 500 MG
975 TABLET ORAL EVERY 6 HOURS
Refills: 0 | Status: DISCONTINUED | OUTPATIENT
Start: 2022-08-17 | End: 2022-08-18

## 2022-08-17 RX ADMIN — Medication 100 MILLIGRAM(S): at 05:41

## 2022-08-17 RX ADMIN — Medication 250 MILLILITER(S): at 07:22

## 2022-08-17 RX ADMIN — SODIUM CHLORIDE 110 MILLILITER(S): 9 INJECTION, SOLUTION INTRAVENOUS at 00:09

## 2022-08-17 RX ADMIN — POLYETHYLENE GLYCOL 3350 17 GRAM(S): 17 POWDER, FOR SOLUTION ORAL at 18:39

## 2022-08-17 RX ADMIN — HEPARIN SODIUM 5000 UNIT(S): 5000 INJECTION INTRAVENOUS; SUBCUTANEOUS at 13:46

## 2022-08-17 RX ADMIN — POLYETHYLENE GLYCOL 3350 17 GRAM(S): 17 POWDER, FOR SOLUTION ORAL at 05:38

## 2022-08-17 RX ADMIN — Medication 400 MILLIGRAM(S): at 04:23

## 2022-08-17 RX ADMIN — Medication 15 MILLIGRAM(S): at 06:06

## 2022-08-17 RX ADMIN — Medication 1000 MILLIGRAM(S): at 09:35

## 2022-08-17 RX ADMIN — HEPARIN SODIUM 5000 UNIT(S): 5000 INJECTION INTRAVENOUS; SUBCUTANEOUS at 00:06

## 2022-08-17 RX ADMIN — Medication 15 MILLIGRAM(S): at 21:13

## 2022-08-17 RX ADMIN — BUPIVACAINE 20 MILLILITER(S): 13.3 INJECTION, SUSPENSION, LIPOSOMAL INFILTRATION at 10:32

## 2022-08-17 RX ADMIN — Medication 1000 MILLIGRAM(S): at 16:36

## 2022-08-17 RX ADMIN — Medication 15 MILLIGRAM(S): at 13:43

## 2022-08-17 RX ADMIN — Medication 15 MILLIGRAM(S): at 05:51

## 2022-08-17 RX ADMIN — Medication 400 MILLIGRAM(S): at 16:21

## 2022-08-17 RX ADMIN — Medication 15 MILLIGRAM(S): at 14:05

## 2022-08-17 RX ADMIN — Medication 20 MILLILITER(S): at 10:31

## 2022-08-17 RX ADMIN — Medication 15 MILLIGRAM(S): at 00:37

## 2022-08-17 RX ADMIN — Medication 400 MILLIGRAM(S): at 09:19

## 2022-08-17 RX ADMIN — Medication 15 MILLIGRAM(S): at 21:45

## 2022-08-17 RX ADMIN — Medication 1000 MILLIGRAM(S): at 05:07

## 2022-08-17 RX ADMIN — HEPARIN SODIUM 5000 UNIT(S): 5000 INJECTION INTRAVENOUS; SUBCUTANEOUS at 23:40

## 2022-08-17 RX ADMIN — Medication 15 MILLIGRAM(S): at 00:05

## 2022-08-17 NOTE — CONSULT NOTE ADULT - ASSESSMENT
37F  ventral hernia repair  PCEA, pain controlled  sqh - LD at midnight    will plan for epidural catheter removal and TAP block today 37F  ventral hernia repair  PCEA, pain controlled  sqh - LD at midnight    will plan for epidural catheter removal and TAP block today    Patient interested in a nerve block procedure as an addition to their current analgesic therapy.   The patient has not experienced satisfactory relief from other treatment modalities including analgesic pain medications.  The risks, benefits and alternatives of a nerve block were discussed with the patient.  The benefits include hopeful relief of pain.  The alternatives include avoiding the procedure and continuing treatment with non-medication therapies and medications, including increasing and/or changing current analgesic medications.  The patient understands that this is an invasive procedure and therefore there are inherent risks. The patient was informed of the risks of the procedure including but not limited to infection, bleeding, injury to nearby tissues, permanent nerve injury, stroke, no decrease in pain or worsened pain, and toxicity from local anesthetic medications.   No certain guarantees have been made and patient understands that responses can vary and repeat procedures may be necessary.

## 2022-08-17 NOTE — PATIENT PROFILE ADULT - FALL HARM RISK - HARM RISK INTERVENTIONS

## 2022-08-17 NOTE — PROCEDURE NOTE - ADDITIONAL PROCEDURE DETAILS
After sterile prep of abdomen and gloves were don, an ultrasound probe was used to visualize the transversus abdominis plane in between the internal oblique and transversus abdominis muscles. A 22G 80mm Pajunk needle was advanced in-plane and 10cc of Exparel + 10cc of Bupivacaine 0.25% was administered into the TAP. Aspiration q5ccs was negative for heme/csf/air. Local anesthetic spread was visualized on ultrasound.  Procedure was repeated on contralateral side with additional 10cc of Exparel + 10cc of Bupivacaine 0.25%.  Meaningful patient verbal response maintained throughout procedure.  Block needle tip identified by ultrasound throughout the procedure.  Patient tolerated procedure well.

## 2022-08-17 NOTE — CONSULT NOTE ADULT - SUBJECTIVE AND OBJECTIVE BOX
Patient is a 37y old  Female who presents with a chief complaint of Ventral hernia repair (16 Aug 2022 23:30)      HPI:  37 year old woman with history of open excision of retroperitoneal tumor (4/14/21) at [place] states that she developed a incisional hernia, she  reports intermittent pain, especially at end of day and with activity. Denies fever/chills/nausea/emesis or changes in bowel or bladder habits. Tolerating diet. Normal bowel movements. She is scheduled for a Open retrorectus incisional hernia repair with mesh possible compartment separation by Dr. Guerra on 8/16/22. Covid vaccine series completed, card in chart, covid test is on 8/12/22. (Toni Perla head [phone] (she has an apnt as per surgeon, but no Medical Clearance required )       (29 Jul 2022 11:38)            Analgesic Dosing for past 24 hours reviewed as below:  acetaminophen     Tablet ..   975 milliGRAM(s) Oral (08-16-22 @ 11:16)    acetaminophen   IVPB ..   400 mL/Hr IV Intermittent (08-17-22 @ 04:23)   400 mL/Hr IV Intermittent (08-16-22 @ 21:24)    celecoxib   400 milliGRAM(s) Oral (08-16-22 @ 11:16)    diphenhydrAMINE   25 milliGRAM(s) Oral (08-16-22 @ 21:27)    ketorolac   Injectable   15 milliGRAM(s) IV Push (08-17-22 @ 05:51)   15 milliGRAM(s) IV Push (08-17-22 @ 00:05)          T(C): 36.4 (08-17-22 @ 04:33), Max: 37.1 (08-16-22 @ 10:56)  HR: 70 (08-17-22 @ 04:33) (70 - 97)  BP: 101/62 (08-17-22 @ 04:33) (101/62 - 127/77)  RR: 17 (08-17-22 @ 04:33) (14 - 19)  SpO2: 96% (08-17-22 @ 04:33) (96% - 100%)      08-16-22 @ 07:01  -  08-17-22 @ 07:00  --------------------------------------------------------  IN: 0 mL / OUT: 745 mL / NET: -745 mL        acetaminophen   IVPB .. 1000 milliGRAM(s) IV Intermittent every 6 hours  ceFAZolin   IVPB 2000 milliGRAM(s) IV Intermittent once  dexAMETHasone  Injectable 4 milliGRAM(s) IV Push every 6 hours PRN  diphenhydrAMINE 25 milliGRAM(s) Oral every 4 hours PRN  fentanyl (2 MICROgram(s)/mL) + bupivacaine 0.0625%  in 0.9% Sodium Chloride PCEA 250 milliLiter(s) Epidural PCA Continuous  ketorolac   Injectable 15 milliGRAM(s) IV Push every 6 hours  lactated ringers. 1000 milliLiter(s) IV Continuous <Continuous>  naloxone Injectable 0.1 milliGRAM(s) IV Push every 3 minutes PRN  ondansetron Injectable 4 milliGRAM(s) IV Push every 6 hours PRN  polyethylene glycol 3350 17 Gram(s) Oral two times a day                          11.8   16.35 )-----------( 367      ( 17 Aug 2022 06:10 )             35.7                 Pain Service   [phone] Patient is a 37y old  Female who presents with a chief complaint of Ventral hernia repair (16 Aug 2022 23:30)      HPI:  37 year old woman with history of open excision of retroperitoneal tumor (4/14/21) at [place] states that she developed a incisional hernia, she  reports intermittent pain, especially at end of day and with activity. Denies fever/chills/nausea/emesis or changes in bowel or bladder habits. Tolerating diet. Normal bowel movements. She is scheduled for a Open retrorectus incisional hernia repair with mesh possible compartment separation by Dr. Guerra on 8/16/22. Covid vaccine series completed, card in chart, covid test is on 8/12/22. (Toni Perla head [phone] (she has an apnt as per surgeon, but no Medical Clearance required )       (29 Jul 2022 11:38)      Interval Hx:  Patient seen during rounds  Patient reports pain to be mod controlled on current medications  discussed removal of pcea today  discussed nerve block for pain control  patient interested in procedure    Analgesic Dosing for past 24 hours reviewed as below:  acetaminophen     Tablet ..   975 milliGRAM(s) Oral (08-16-22 @ 11:16)    acetaminophen   IVPB ..   400 mL/Hr IV Intermittent (08-17-22 @ 04:23)   400 mL/Hr IV Intermittent (08-16-22 @ 21:24)    celecoxib   400 milliGRAM(s) Oral (08-16-22 @ 11:16)    diphenhydrAMINE   25 milliGRAM(s) Oral (08-16-22 @ 21:27)    ketorolac   Injectable   15 milliGRAM(s) IV Push (08-17-22 @ 05:51)   15 milliGRAM(s) IV Push (08-17-22 @ 00:05)          T(C): 36.4 (08-17-22 @ 04:33), Max: 37.1 (08-16-22 @ 10:56)  HR: 70 (08-17-22 @ 04:33) (70 - 97)  BP: 101/62 (08-17-22 @ 04:33) (101/62 - 127/77)  RR: 17 (08-17-22 @ 04:33) (14 - 19)  SpO2: 96% (08-17-22 @ 04:33) (96% - 100%)      08-16-22 @ 07:01  -  08-17-22 @ 07:00  --------------------------------------------------------  IN: 0 mL / OUT: 745 mL / NET: -745 mL        acetaminophen   IVPB .. 1000 milliGRAM(s) IV Intermittent every 6 hours  ceFAZolin   IVPB 2000 milliGRAM(s) IV Intermittent once  dexAMETHasone  Injectable 4 milliGRAM(s) IV Push every 6 hours PRN  diphenhydrAMINE 25 milliGRAM(s) Oral every 4 hours PRN  fentanyl (2 MICROgram(s)/mL) + bupivacaine 0.0625%  in 0.9% Sodium Chloride PCEA 250 milliLiter(s) Epidural PCA Continuous  ketorolac   Injectable 15 milliGRAM(s) IV Push every 6 hours  lactated ringers. 1000 milliLiter(s) IV Continuous <Continuous>  naloxone Injectable 0.1 milliGRAM(s) IV Push every 3 minutes PRN  ondansetron Injectable 4 milliGRAM(s) IV Push every 6 hours PRN  polyethylene glycol 3350 17 Gram(s) Oral two times a day                          11.8   16.35 )-----------( 367      ( 17 Aug 2022 06:10 )             35.7                 Pain Service   [phone]

## 2022-08-18 ENCOUNTER — TRANSCRIPTION ENCOUNTER (OUTPATIENT)
Age: 37
End: 2022-08-18

## 2022-08-18 VITALS
HEART RATE: 88 BPM | OXYGEN SATURATION: 95 % | DIASTOLIC BLOOD PRESSURE: 70 MMHG | SYSTOLIC BLOOD PRESSURE: 116 MMHG | RESPIRATION RATE: 18 BRPM | TEMPERATURE: 98 F

## 2022-08-18 PROCEDURE — 85027 COMPLETE CBC AUTOMATED: CPT

## 2022-08-18 PROCEDURE — 88302 TISSUE EXAM BY PATHOLOGIST: CPT

## 2022-08-18 PROCEDURE — 36415 COLL VENOUS BLD VENIPUNCTURE: CPT

## 2022-08-18 PROCEDURE — 85025 COMPLETE CBC W/AUTO DIFF WBC: CPT

## 2022-08-18 PROCEDURE — 83735 ASSAY OF MAGNESIUM: CPT

## 2022-08-18 PROCEDURE — 80048 BASIC METABOLIC PNL TOTAL CA: CPT

## 2022-08-18 PROCEDURE — 84100 ASSAY OF PHOSPHORUS: CPT

## 2022-08-18 PROCEDURE — C1781: CPT

## 2022-08-18 PROCEDURE — 88304 TISSUE EXAM BY PATHOLOGIST: CPT

## 2022-08-18 PROCEDURE — C9399: CPT

## 2022-08-18 RX ADMIN — HEPARIN SODIUM 5000 UNIT(S): 5000 INJECTION INTRAVENOUS; SUBCUTANEOUS at 10:13

## 2022-08-18 RX ADMIN — Medication 15 MILLIGRAM(S): at 10:43

## 2022-08-18 RX ADMIN — Medication 15 MILLIGRAM(S): at 10:13

## 2022-08-18 RX ADMIN — Medication 15 MILLIGRAM(S): at 16:12

## 2022-08-18 RX ADMIN — Medication 15 MILLIGRAM(S): at 04:23

## 2022-08-18 RX ADMIN — Medication 15 MILLIGRAM(S): at 05:21

## 2022-08-18 RX ADMIN — POLYETHYLENE GLYCOL 3350 17 GRAM(S): 17 POWDER, FOR SOLUTION ORAL at 05:18

## 2022-08-18 NOTE — PROGRESS NOTE ADULT - ASSESSMENT
Sarah 37f s/p retrorectus hernia repair w/ mesh     - pass tov  - on regular diet   - continue bowel reg- miralax  - Plan for discharge.   - outpatient followup with Dr. Bledsoe 8/22 and Dr. Guerra in 16 days.   
The patient is a 37y Female POD 1 from a Open retrorectus hernia repair with mesh. doing well pain controlled, with epidural in place,     Plan:  - Pain control epidural in place, plan to remove in AM and perform early AM TAP block  - Novoa out when epidural out  - tylenol toradol  - bowel reg  - CLD will ADAT in AM  - DVT ppx, will holdheparin 6 hours prior to removal  - OOB and ambulating as tolerated  - F/u AM labs.  
37F  ventral hernia repair  pain controlled off PCEA  good relief with TAP block  due for dc today    Pain controlled  Pain service will sign off  Please reconsult as needed

## 2022-08-18 NOTE — DISCHARGE NOTE NURSING/CASE MANAGEMENT/SOCIAL WORK - NSDCPEFALRISK_GEN_ALL_CORE
For information on Fall & Injury Prevention, visit: [email or web address] OR  [email or web address] OR  https://www.cdc.gov/steadi/patient.html

## 2022-08-18 NOTE — DISCHARGE NOTE NURSING/CASE MANAGEMENT/SOCIAL WORK - NSPROEXTENSIONSOFSELF_GEN_A_NUR
Patient is aware that Medicare has the wrong birthday  He told me that he has spoke to Medicare already regarding this issue and they are to fix it  none

## 2022-08-18 NOTE — PROGRESS NOTE ADULT - ATTENDING COMMENTS
POD2, tolerating diet, pain well controlled  Abdomen soft, incisional tenderness  Plan for discharge home when discharge criteria are met
POD1, pain well controlled, tolerating diet  Abdomen soft, incisional tenderness  Epidural removed, TAP block performed, monroy removed   Plan for discharge home when discharge criteria are met

## 2022-08-18 NOTE — DISCHARGE NOTE NURSING/CASE MANAGEMENT/SOCIAL WORK - PATIENT PORTAL LINK FT
You can access the FollowMyHealth Patient Portal offered by [facility] by registering at the following website: [email or web address]. By joining [employer]’s FollowMyHealth portal, you will also be able to view your health information using other applications (apps) compatible with our system.

## 2022-08-18 NOTE — PROGRESS NOTE ADULT - SUBJECTIVE AND OBJECTIVE BOX
Subjective:  Yesterday the patient reported that she was in pain and experience nausea. Today the patient reports improvement of her symptoms. She is ambulating, voiding, and doing well.       STATUS POST:  Retrorectus Hernia repair w/ mesh    POST OPERATIVE DAY #: 2    MEDICATIONS  (STANDING):  ceFAZolin   IVPB 2000 milliGRAM(s) IV Intermittent once  heparin   Injectable 5000 Unit(s) SubCutaneous every 12 hours  polyethylene glycol 3350 17 Gram(s) Oral two times a day    MEDICATIONS  (PRN):  acetaminophen     Tablet .. 975 milliGRAM(s) Oral every 6 hours PRN Temp greater or equal to 38C (100.4F), Mild Pain (1 - 3), Moderate Pain (4 - 6)  dexAMETHasone  Injectable 4 milliGRAM(s) IV Push every 6 hours PRN Nausea  diphenhydrAMINE 25 milliGRAM(s) Oral every 4 hours PRN Pruritus  ketorolac   Injectable 15 milliGRAM(s) IV Push every 6 hours PRN Severe Pain (7 - 10)  ketorolac   Injectable 15 milliGRAM(s) IV Push every 6 hours PRN Breakthrough Pain  naloxone Injectable 0.1 milliGRAM(s) IV Push every 3 minutes PRN For ANY of the following changes in patient status:  A. RR LESS THAN 10 breaths per minute, B. Oxygen saturation LESS THAN 90%, C. Sedation score of 6  ondansetron Injectable 4 milliGRAM(s) IV Push every 6 hours PRN Nausea      Vital Signs Last 24 Hrs  T(C): 36.8 (18 Aug 2022 07:39), Max: 37.2 (17 Aug 2022 11:05)  T(F): 98.2 (18 Aug 2022 07:39), Max: 98.9 (17 Aug 2022 11:05)  HR: 84 (18 Aug 2022 07:39) (69 - 84)  BP: 127/82 (18 Aug 2022 07:39) (98/55 - 127/82)  BP(mean): --  RR: 18 (18 Aug 2022 07:39) (18 - 18)  SpO2: 99% (18 Aug 2022 07:39) (95% - 99%)    Parameters below as of 18 Aug 2022 07:39  Patient On (Oxygen Delivery Method): room air          08-17 - 08-18  --------------------------------------------------------  IN:    Lactated Ringers: 110 mL  Total IN: 110 mL    OUT:    Bulb (mL): 103 mL    Indwelling Catheter - Urethral (mL): 1890 mL    Voided (mL): 1100 mL  Total OUT: 3093 mL    Total NET: -2983 mL          Physical Exam:    Constitutional: NAD, lying in bed  Respiratory: Respirations non-labored, no accessory muscle use  Cardiovascular: Regular rate & rhythm  Gastrointestinal: binder, with prevena on suction, some tenderness to palpation   Neurological: A&O x 3; without gross deficit       LABS:                        11.8   15.79 )-----------( 338      ( 17 Aug 2022 09:35 )             35.4     08-17    138  |  103  |  9.6  ----------------------------<  133<H>  4.2   |  26.0  |  0.65    Ca    8.5      17 Aug 2022 06:10  Phos  3.3     08-17  Mg     2.3     08-17          
HPI/OVERNIGHT EVENTS: Patient seen and examined at bedside this AM. No overnight events. Pain controlled, with monroy afebrile,VSS, tolerating clears    Vital Signs Last 24 Hrs  T(C): 36.9 (17 Aug 2022 00:11), Max: 37.1 (16 Aug 2022 10:56)  T(F): 98.4 (17 Aug 2022 00:11), Max: 98.7 (16 Aug 2022 10:56)  HR: 90 (17 Aug 2022 00:11) (77 - 97)  BP: 106/63 (17 Aug 2022 00:11) (106/63 - 127/77)  BP(mean): 73 (16 Aug 2022 17:45) (71 - 78)  RR: 16 (17 Aug 2022 00:11) (14 - 19)  SpO2: 97% (17 Aug 2022 00:11) (96% - 100%)    Parameters below as of 17 Aug 2022 00:11  Patient On (Oxygen Delivery Method): nasal cannula  O2 Flow (L/min): 1      I&O's Detail    16 Aug 2022 07:01  -  17 Aug 2022 03:52  --------------------------------------------------------  IN:  Total IN: 0 mL    OUT:    Bulb (mL): 25 mL    Indwelling Catheter - Urethral (mL): 495 mL  Total OUT: 520 mL    Total NET: -520 mL          Physical Exam:  General: NAD, resting comfortably in bed  Pulmonary: Nonlabored breathing, no respiratory distress on RA, IS 1100  Cardiovascular: NSR  Abdominal: soft, appropriatley tender, prevena with good seal, drain with serosanguinous fluid, binder in place  Extremities: WWP    LABS:                MEDICATIONS  (STANDING):  acetaminophen   IVPB .. 1000 milliGRAM(s) IV Intermittent every 6 hours  ceFAZolin   IVPB 2000 milliGRAM(s) IV Intermittent once  ceFAZolin   IVPB 2000 milliGRAM(s) IV Intermittent every 8 hours  fentanyl (2 MICROgram(s)/mL) + bupivacaine 0.0625%  in 0.9% Sodium Chloride PCEA 250 milliLiter(s) Epidural PCA Continuous  ketorolac   Injectable 15 milliGRAM(s) IV Push every 6 hours  lactated ringers. 1000 milliLiter(s) (110 mL/Hr) IV Continuous <Continuous>  polyethylene glycol 3350 17 Gram(s) Oral two times a day    MEDICATIONS  (PRN):  dexAMETHasone  Injectable 4 milliGRAM(s) IV Push every 6 hours PRN Nausea  diphenhydrAMINE 25 milliGRAM(s) Oral every 4 hours PRN Pruritus  naloxone Injectable 0.1 milliGRAM(s) IV Push every 3 minutes PRN For ANY of the following changes in patient status:  A. RR LESS THAN 10 breaths per minute, B. Oxygen saturation LESS THAN 90%, C. Sedation score of 6  ondansetron Injectable 4 milliGRAM(s) IV Push every 6 hours PRN Nausea      MICRO:   Cultures     STUDIES:   EKG, CXR, U/S, CT, MRI   
Interval Hx:  Patient seen during rounds  Patient reports pain to be controlled on current medications      Analgesic Dosing for past 24 hours reviewed as below:    acetaminophen   IVPB ..   400 mL/Hr IV Intermittent (08-17-22 @ 16:21)    ketorolac   Injectable   15 milliGRAM(s) IV Push (08-17-22 @ 13:43)    ketorolac   Injectable   15 milliGRAM(s) IV Push (08-18-22 @ 10:13)   15 milliGRAM(s) IV Push (08-18-22 @ 04:23)   15 milliGRAM(s) IV Push (08-17-22 @ 21:13)          T(C): 36.8 (08-18-22 @ 12:28), Max: 36.9 (08-17-22 @ 22:58)  HR: 74 (08-18-22 @ 12:28) (69 - 84)  BP: 113/77 (08-18-22 @ 12:28) (98/55 - 127/82)  RR: 16 (08-18-22 @ 12:28) (16 - 18)  SpO2: 97% (08-18-22 @ 12:28) (95% - 99%)      08-17-22 @ 07:01  -  08-18-22 @ 07:00  --------------------------------------------------------  IN: 110 mL / OUT: 3093 mL / NET: -2983 mL        acetaminophen     Tablet .. 975 milliGRAM(s) Oral every 6 hours PRN  ceFAZolin   IVPB 2000 milliGRAM(s) IV Intermittent once  dexAMETHasone  Injectable 4 milliGRAM(s) IV Push every 6 hours PRN  diphenhydrAMINE 25 milliGRAM(s) Oral every 4 hours PRN  heparin   Injectable 5000 Unit(s) SubCutaneous every 12 hours  ketorolac   Injectable 15 milliGRAM(s) IV Push every 6 hours PRN  ketorolac   Injectable 15 milliGRAM(s) IV Push every 6 hours PRN  naloxone Injectable 0.1 milliGRAM(s) IV Push every 3 minutes PRN  ondansetron Injectable 4 milliGRAM(s) IV Push every 6 hours PRN  polyethylene glycol 3350 17 Gram(s) Oral two times a day                          11.8   15.79 )-----------( 338      ( 17 Aug 2022 09:35 )             35.4     08-17    138  |  103  |  9.6  ----------------------------<  133<H>  4.2   |  26.0  |  0.65    Ca    8.5      17 Aug 2022 06:10  Phos  3.3     08-17  Mg     2.3     08-17            Pain Service   [phone]

## 2022-08-22 ENCOUNTER — APPOINTMENT (OUTPATIENT)
Dept: SURGERY | Facility: CLINIC | Age: 37
End: 2022-08-22

## 2022-08-22 VITALS
TEMPERATURE: 97.3 F | BODY MASS INDEX: 37.36 KG/M2 | DIASTOLIC BLOOD PRESSURE: 73 MMHG | RESPIRATION RATE: 16 BRPM | WEIGHT: 203 LBS | OXYGEN SATURATION: 97 % | HEIGHT: 62 IN | SYSTOLIC BLOOD PRESSURE: 113 MMHG | HEART RATE: 79 BPM

## 2022-08-22 PROCEDURE — 99024 POSTOP FOLLOW-UP VISIT: CPT

## 2022-08-22 NOTE — HISTORY OF PRESENT ILLNESS
[de-identified] : This patient is a pleasant 37-year-old female who is recently status post an open retrorectus incisional hernia repair by Dr. Guerra.  She is doing well and has improving pain postoperatively.  She was discharged with a Prevena VAC system in place with a left lower quadrant ARELI.  She is here to remove both.  Denies fever/chills.  She states the ARELI drain is putting out 25 cc/day, serous.

## 2022-08-22 NOTE — PHYSICAL EXAM
[JVD] : no jugular venous distention  [Normal Breath Sounds] : Normal breath sounds [Normal Heart Sounds] : normal heart sounds [Normal Rate and Rhythm] : normal rate and rhythm [No HSM] : no hepatosplenomegaly [de-identified] : Well-appearing, no acute distress [de-identified] : Soft, nondistended, nontender, midline incision clean/dry/intact, Anita removed, postoperative drain on left abdomen removed, incision and drain site dressed with gauze.

## 2022-08-22 NOTE — ASSESSMENT
[FreeTextEntry1] : Doing well status post open retrorectus incisional hernia repair.  Follow-up next week with Dr. Guerra.

## 2022-08-25 LAB — SURGICAL PATHOLOGY STUDY: SIGNIFICANT CHANGE UP

## 2022-09-01 ENCOUNTER — APPOINTMENT (OUTPATIENT)
Dept: SURGERY | Facility: CLINIC | Age: 37
End: 2022-09-01

## 2022-09-01 VITALS
TEMPERATURE: 97.3 F | DIASTOLIC BLOOD PRESSURE: 70 MMHG | HEART RATE: 79 BPM | HEIGHT: 62 IN | WEIGHT: 206 LBS | SYSTOLIC BLOOD PRESSURE: 104 MMHG | OXYGEN SATURATION: 98 % | BODY MASS INDEX: 37.91 KG/M2 | RESPIRATION RATE: 16 BRPM

## 2022-09-01 PROCEDURE — 99024 POSTOP FOLLOW-UP VISIT: CPT

## 2022-09-01 NOTE — PHYSICAL EXAM
[JVD] : no jugular venous distention  [No Rash or Lesion] : No rash or lesion [Alert] : alert [Oriented to Person] : oriented to person [Oriented to Place] : oriented to place [Oriented to Time] : oriented to time [Calm] : calm [de-identified] : No acute distress [de-identified] : No respiratory distress [de-identified] : Regular rate [de-identified] : soft, nontender. incision c/d/i. staples in place - removed at bedside [de-identified] : normal range of motion

## 2022-09-01 NOTE — ASSESSMENT
[FreeTextEntry1] : Ms. HAIRSTON is a 37 year old woman s/p retrorectus incisional hernia repair with mesh on 8/16/22, seen in office last week for removal of wound vacuum device and removal of ARELI drain, who returns today for followup visit. Staples removed.

## 2022-09-01 NOTE — HISTORY OF PRESENT ILLNESS
[de-identified] : Ms. HAIRSTON is a 37 year old woman s/p retrorectus incisional hernia repair with mesh on 8/16/22, seen in office last week for removal of wound vacuum device and removal of ARELI drain, who returns today for followup visit for staple removal. She is doing well. Denies pain. Denies fever/chills. No redness or pain at incision site. Tolerating diet. Normal bowel movements.\par

## 2022-11-17 ENCOUNTER — APPOINTMENT (OUTPATIENT)
Dept: GASTROENTEROLOGY | Facility: CLINIC | Age: 37
End: 2022-11-17

## 2022-11-17 VITALS — HEIGHT: 62 IN | WEIGHT: 200 LBS | BODY MASS INDEX: 36.8 KG/M2

## 2022-11-17 PROCEDURE — 91200 LIVER ELASTOGRAPHY: CPT

## 2022-12-08 NOTE — ASSESSMENT
[FreeTextEntry1] : FibroScan/ Vibration Controlled Transient Elastography (VCTE) Report\par \par PROBE SIZE: XL\par \par INDICATION: STEATOSIS\par \par REFERRING PHYSICIAN: DR LOUIE\par \par  \par PROCEDURE:\par \par Patient was NPO for 4 hours prior to procedure. After providing oral explanations of the procedure to the patient, patient was placed in supine position with right arm in maximum abduction to allow optimal exposure of right lateral abdomen. Patient abdomen was briefly assessed to identify to the terminus of the xyphoid process. The ideal target testing spot was located mid-line and lateral to this point. To acquire proper signals, the skin to liver capsule distance was accessed with the FibroScan probe. Patient was instructed to breathe normally and to abstain from sudden movements during the procedure. Ten shear waves were produced and measurements of the speed of each wave evaluated. Patient tolerated the procedure well and was discharged without incident.\par  2 Fibroscan Images are scanned in. Report is based on the second image scanned in\par FINDINGS:\par \par - Median shear wave speed of   1.81meters/second.\par \par - This corresponds to a median Liver Stiffness Score of  9.9kPa.\par \par - IQR/med  17 %\par \par - CAP  306 dB/m\par  \par RESULTS:\par \par FIBROSIS: F3 Fibrosis\par \par STEATOSIS: S3 - Stage   steatosis ->50%r fatty change in live\par \par  \par \par The recommendation regarding fibrosis stage and/ or steatosis grade is based on current published scientific literature and the provided patient’s diagnosis. Any further medical or surgical intervention should be made by considering the overall medical condition of the patient.\par

## 2023-01-10 ENCOUNTER — APPOINTMENT (OUTPATIENT)
Dept: GASTROENTEROLOGY | Facility: CLINIC | Age: 38
End: 2023-01-10
Payer: MEDICAID

## 2023-01-10 VITALS
OXYGEN SATURATION: 98 % | BODY MASS INDEX: 37.17 KG/M2 | TEMPERATURE: 98.7 F | WEIGHT: 202 LBS | DIASTOLIC BLOOD PRESSURE: 78 MMHG | HEIGHT: 62 IN | HEART RATE: 89 BPM | SYSTOLIC BLOOD PRESSURE: 121 MMHG | RESPIRATION RATE: 16 BRPM

## 2023-01-10 PROCEDURE — T1013: CPT

## 2023-01-10 PROCEDURE — 99215 OFFICE O/P EST HI 40 MIN: CPT

## 2023-01-10 NOTE — ASSESSMENT
[FreeTextEntry1] : RONNY HAIRSTON is a 37 year old female with a PMH significant for fatty liver, retroperitoneal tumor excision (April 2021), umbilical hernia repair (August 2022), and obesity.\par \par Elevated LFTs\par most likely due to NAFLD however given her history it would be prudent to rule out other causes\par Labs ordered to rule out competing etiologies of liver disease\par CT AP w/IVC to rule out Budd Chiari given her history of retroperitoneal surgery in the past\par Follow up in 6-8 weeks

## 2023-01-10 NOTE — REASON FOR VISIT
[Consultation] : a consultation visit [[employer] ] : provided by [employer]   [Time Spent: ____ minutes] : Total time spent using  services: [unfilled] minutes. The patient's primary language is not English thus required  services. [FreeTextEntry1] : elevated LFTs [Interpreters_IDNumber] : [id number] [Interpreters_FullName] : Melvin [TWNoteComboBox1] : Kazakh

## 2023-01-10 NOTE — PHYSICAL EXAM
[Non-Tender] : non-tender [Smooth] : smooth [General Appearance - Alert] : alert [General Appearance - In No Acute Distress] : in no acute distress [Sclera] : the sclera and conjunctiva were normal [Outer Ear] : the ears and nose were normal in appearance [Oropharynx] : the oropharynx was normal [Neck Appearance] : the appearance of the neck was normal [Edema] : there was no peripheral edema [Bowel Sounds] : normal bowel sounds [Abdomen Soft] : soft [Abdomen Tenderness] : non-tender [Abdomen Mass (___ Cm)] : no abdominal mass palpated [Abnormal Walk] : normal gait [Nail Clubbing] : no clubbing  or cyanosis of the fingernails [Musculoskeletal - Swelling] : no joint swelling seen [Motor Tone] : muscle strength and tone were normal [Skin Color & Pigmentation] : normal skin color and pigmentation [Skin Turgor] : normal skin turgor [] : no rash [No Focal Deficits] : no focal deficits [Oriented To Time, Place, And Person] : oriented to person, place, and time [Impaired Insight] : insight and judgment were intact [Affect] : the affect was normal [Scleral Icterus] : No Scleral Icterus [Spider Angioma] : No spider angioma(s) were observed [Abdominal  Ascites] : no ascites [Splenomegaly] : no splenomegaly [Asterixis] : no asterixis observed [Jaundice] : No jaundice [Palmar Erythema] : no Palmar Erythema [Depression] : no depression [Hallucinations] : ~T no ~M hallucinations

## 2023-01-10 NOTE — ADDENDUM
[FreeTextEntry1] : I, Triny Huggins NP, acted as scribe for NAHID Davidson for this patient encounter.

## 2023-01-10 NOTE — HISTORY OF PRESENT ILLNESS
[de-identified] : RONNY HAIRSTON is a 37 year old female with a PMH significant for fatty liver, retroperitoneal tumor excision (April 2021), umbilical hernia repair (August 2022), and obesity.\par \par She presents today for evaluation of elevated LFTs. Labs from May 2022 - bilirubin 0.2, , , AP 91, viral hepatitis negative, ASMA elevated but SAMI negative. More recent LFTs done in December 2022 were bilirubin 0.4, AST 40, ALT 78 and . CT AP w/IVC done April 2022 was unremarkable for any hepatic abnormalities. FibroScan done 11/17/22 showed F3, S3.\par \par Denies excessive alcohol consumption. Denies recent travel. Denies any new medications in the last 1-2 years. Pt reports she has gained 5-10 pounds in the last year. Denies fatigue, malaise, arthralgias, myalgias, pruritus, recent infection, abdominal pain or distension, jaundice, hematemesis, hematochezia, dark urine, confusion, unintentional weight loss or gain. Pt reports father had cirrhosis and passed away from pancreatic cancer. Pt was born in [facility].

## 2023-02-21 ENCOUNTER — RESULT REVIEW (OUTPATIENT)
Age: 38
End: 2023-02-21

## 2023-02-21 ENCOUNTER — APPOINTMENT (OUTPATIENT)
Dept: CT IMAGING | Facility: CLINIC | Age: 38
End: 2023-02-21
Payer: MEDICAID

## 2023-02-21 PROCEDURE — 74177 CT ABD & PELVIS W/CONTRAST: CPT

## 2023-02-24 ENCOUNTER — NON-APPOINTMENT (OUTPATIENT)
Age: 38
End: 2023-02-24

## 2023-03-16 LAB
A1AT SERPL-MCNC: 159 MG/DL
AFP-TM SERPL-MCNC: 1.9 NG/ML
ALBUMIN SERPL ELPH-MCNC: 4.7 G/DL
ALP BLD-CCNC: 117 U/L
ALT SERPL-CCNC: 44 U/L
ANA PAT FLD IF-IMP: ABNORMAL
ANA SER IF-ACNC: ABNORMAL
ANION GAP SERPL CALC-SCNC: 14 MMOL/L
AST SERPL-CCNC: 25 U/L
BASOPHILS # BLD AUTO: 0.01 K/UL
BASOPHILS NFR BLD AUTO: 0.1 %
BILIRUB DIRECT SERPL-MCNC: 0.1 MG/DL
BILIRUB INDIRECT SERPL-MCNC: 0.4 MG/DL
BILIRUB SERPL-MCNC: 0.5 MG/DL
BUN SERPL-MCNC: 11 MG/DL
CALCIUM SERPL-MCNC: 9.3 MG/DL
CERULOPLASMIN SERPL-MCNC: 20 MG/DL
CHLORIDE SERPL-SCNC: 101 MMOL/L
CO2 SERPL-SCNC: 26 MMOL/L
CREAT SERPL-MCNC: 0.71 MG/DL
EGFR: 113 ML/MIN/1.73M2
EOSINOPHIL # BLD AUTO: 0.16 K/UL
EOSINOPHIL NFR BLD AUTO: 2.1 %
FERRITIN SERPL-MCNC: 53 NG/ML
GLUCOSE SERPL-MCNC: 99 MG/DL
HBV SURFACE AB SER QL: NONREACTIVE
HCT VFR BLD CALC: 41.1 %
HEPATITIS A IGG ANTIBODY: NONREACTIVE
HGB BLD-MCNC: 13.6 G/DL
IGG SER QL IEP: 1255 MG/DL
IMM GRANULOCYTES NFR BLD AUTO: 0.3 %
INR PPP: 1.07 RATIO
IRON SATN MFR SERPL: 17 %
IRON SERPL-MCNC: 65 UG/DL
LKM AB SER QL IF: <20.1 UNITS
LYMPHOCYTES # BLD AUTO: 1.97 K/UL
LYMPHOCYTES NFR BLD AUTO: 26.3 %
MAN DIFF?: NORMAL
MCHC RBC-ENTMCNC: 30.2 PG
MCHC RBC-ENTMCNC: 33.1 GM/DL
MCV RBC AUTO: 91.3 FL
MITOCHONDRIA AB SER IF-ACNC: NORMAL
MONOCYTES # BLD AUTO: 0.51 K/UL
MONOCYTES NFR BLD AUTO: 6.8 %
NEUTROPHILS # BLD AUTO: 4.82 K/UL
NEUTROPHILS NFR BLD AUTO: 64.4 %
NT-PROBNP SERPL-MCNC: 70 PG/ML
PLATELET # BLD AUTO: 391 K/UL
POTASSIUM SERPL-SCNC: 4.1 MMOL/L
PROT SERPL-MCNC: 7.7 G/DL
PT BLD: 12.4 SEC
RBC # BLD: 4.5 M/UL
RBC # FLD: 14 %
SMOOTH MUSCLE AB SER QL IF: ABNORMAL
SODIUM SERPL-SCNC: 140 MMOL/L
SOLUBLE LIVER IGG SER IA-ACNC: 2.1
TIBC SERPL-MCNC: 379 UG/DL
TSH SERPL-ACNC: 1.85 UIU/ML
UIBC SERPL-MCNC: 314 UG/DL
WBC # FLD AUTO: 7.49 K/UL

## 2023-04-12 ENCOUNTER — APPOINTMENT (OUTPATIENT)
Dept: GASTROENTEROLOGY | Facility: CLINIC | Age: 38
End: 2023-04-12
Payer: MEDICAID

## 2023-04-12 VITALS
SYSTOLIC BLOOD PRESSURE: 111 MMHG | OXYGEN SATURATION: 98 % | RESPIRATION RATE: 16 BRPM | HEIGHT: 62 IN | HEART RATE: 73 BPM | WEIGHT: 200 LBS | BODY MASS INDEX: 36.8 KG/M2 | DIASTOLIC BLOOD PRESSURE: 65 MMHG | TEMPERATURE: 97.7 F

## 2023-04-12 DIAGNOSIS — R79.89 OTHER SPECIFIED ABNORMAL FINDINGS OF BLOOD CHEMISTRY: ICD-10-CM

## 2023-04-12 DIAGNOSIS — K76.0 FATTY (CHANGE OF) LIVER, NOT ELSEWHERE CLASSIFIED: ICD-10-CM

## 2023-04-12 PROCEDURE — T1013A: CUSTOM

## 2023-04-12 PROCEDURE — 99215 OFFICE O/P EST HI 40 MIN: CPT

## 2023-04-12 NOTE — ASSESSMENT
[FreeTextEntry1] : RONNY HAIRSTON is a 37 year old female with a PMH significant for fatty liver, retroperitoneal tumor excision (April 2021), umbilical hernia repair (August 2022), and obesity.\par \par Elevated LFTs\par Most likely DILI.\par LFTs have normalized. SAMI/ASMA + which could also be caused by DILI. CT A/P w/IVC (2/21/23) showed mild hepatic steatosis, hepatic and portal veins were patent.\par \par NAFLD\par Etiology of fatty liver disease explained to pt in detail along with complications of disease progression.\par Recommend lifestyle modifications in the form of diet and exercise. Gradual weight loss of 7-10% of current body weight. These changes have been shown to lead to regression or even resolution of steatosis, inflammation, and even fibrosis in some patients.\par Copy of Mediterranean diet given.\par If LFTs remain elevated despite lifestyle modifications, will consider Liver Biopsy.\par

## 2023-04-12 NOTE — ADDENDUM
[FreeTextEntry1] : I, Triny Huggins NP, acted as scribe for NAHID Davidson for this patient encounter.  Expected Date Of Service: 04/02/2021 Bill For Surgical Tray: no Billing Type: Third-Party Bill

## 2023-04-12 NOTE — HISTORY OF PRESENT ILLNESS
[de-identified] : RONNY HAIRSTON is a 37 year old female with a PMH significant for fatty liver, retroperitoneal tumor excision (April 2021), umbilical hernia repair (August 2022), and obesity.\par \par 1/10/23: She presents today for evaluation of elevated LFTs. Labs from May 2022 - bilirubin 0.2, , , AP 91, viral hepatitis negative, ASMA elevated but SAMI negative. More recent LFTs done in December 2022 were bilirubin 0.4, AST 40, ALT 78 and . CT AP w/IVC done April 2022 was unremarkable for any hepatic abnormalities. FibroScan done 11/17/22 showed F3, S3.\par \par Denies excessive alcohol consumption. Denies recent travel. Denies any new medications in the last 1-2 years. Pt reports she has gained 5-10 pounds in the last year. Denies fatigue, malaise, arthralgias, myalgias, pruritus, recent infection, abdominal pain or distension, jaundice, hematemesis, hematochezia, dark urine, confusion, unintentional weight loss or gain. Pt reports father had cirrhosis and passed away from pancreatic cancer. Pt was born in [facility].\par \par 4/12/23: Pt present today for follow up visit. Labs reviewed with pt. LFTs have normalized. SAMI/ASMA positive. IGG normal. Iron studies normal. Alpha 1 normal, ceruloplasmin 20, AFP normal. CT A/P w/IVC (2/21/23) showed mild hepatic steatosis, hepatic and portal veins were patent.

## 2023-04-24 NOTE — PATIENT PROFILE ADULT - PRO INTERPRETER NEED 2
Slovenian
[FreeTextEntry1] : #HCM\par - routine lab testing\par \par #hearing loss\par - cerumen impaction of right side\par - recommend OTC debrox\par - if no improvement, suggest to f/u with an ENT

## 2023-05-18 NOTE — PROCEDURE NOTE - NSINFORMCONSENT_GEN_A_CORE
Benefits, risks, and possible complications of procedure explained to patient/caregiver who verbalized understanding and gave written consent. [Negative] : Musculoskeletal

## 2023-10-23 NOTE — REVIEW OF SYSTEMS
OK to refill until she can be seen.  Please have her schedule a 30 min.  I'll look out for cancellations.  Does she have certain restrictions in terms of days she can and cannot come in?  Is she available for last minute cancellations typically?  Thanks    [As Noted in HPI] : as noted in HPI [Negative] : Heme/Lymph

## 2023-12-05 ENCOUNTER — NON-APPOINTMENT (OUTPATIENT)
Age: 38
End: 2023-12-05

## 2023-12-27 ENCOUNTER — NON-APPOINTMENT (OUTPATIENT)
Age: 38
End: 2023-12-27

## 2024-01-12 ENCOUNTER — APPOINTMENT (OUTPATIENT)
Dept: SURGERY | Facility: CLINIC | Age: 39
End: 2024-01-12
Payer: COMMERCIAL

## 2024-01-12 VITALS
RESPIRATION RATE: 16 BRPM | DIASTOLIC BLOOD PRESSURE: 80 MMHG | WEIGHT: 201 LBS | TEMPERATURE: 97.9 F | HEART RATE: 95 BPM | SYSTOLIC BLOOD PRESSURE: 117 MMHG | HEIGHT: 62 IN | BODY MASS INDEX: 36.99 KG/M2 | OXYGEN SATURATION: 99 %

## 2024-01-12 DIAGNOSIS — Z98.890 OTHER SPECIFIED POSTPROCEDURAL STATES: ICD-10-CM

## 2024-01-12 DIAGNOSIS — Z87.19 OTHER SPECIFIED POSTPROCEDURAL STATES: ICD-10-CM

## 2024-01-12 DIAGNOSIS — R10.13 EPIGASTRIC PAIN: ICD-10-CM

## 2024-01-12 PROCEDURE — 99214 OFFICE O/P EST MOD 30 MIN: CPT

## 2024-01-12 NOTE — ASSESSMENT
[FreeTextEntry1] : Ms. HAIRSTON is a 38 year old woman s/p retrorectus incisional hernia repair with mesh on 8/16/22 who returns today with complains of bulging/pain at upper midline abdomen. No definitive hernia recurrence palpable on physical exam. I recommend CT scan to assess for presence of hernia recurrence.

## 2024-01-12 NOTE — PHYSICAL EXAM
[JVD] : no jugular venous distention  [No Rash or Lesion] : No rash or lesion [Alert] : alert [Oriented to Person] : oriented to person [Oriented to Place] : oriented to place [Oriented to Time] : oriented to time [Calm] : calm [de-identified] : No acute distress [de-identified] : No respiratory distress [de-identified] : Regular rate [de-identified] : soft, nontender. incision well-healde. no palpable hernia recurrence in midline abdomen at site of concern.  [de-identified] : normal range of motion

## 2024-01-12 NOTE — HISTORY OF PRESENT ILLNESS
[de-identified] : Ms. HAIRSTON is a 38 year old woman s/p retrorectus incisional hernia repair with mesh on 8/16/22 who returns today with complains of bulging/pain at upper midline abdomen. She reports these symptoms have been present for 1 month. Denies fever/chills. No redness or pain at incision site. Tolerating diet. Normal bowel movements.

## 2024-02-20 ENCOUNTER — APPOINTMENT (OUTPATIENT)
Dept: MRI IMAGING | Facility: CLINIC | Age: 39
End: 2024-02-20

## 2024-03-07 ENCOUNTER — APPOINTMENT (OUTPATIENT)
Dept: MRI IMAGING | Facility: CLINIC | Age: 39
End: 2024-03-07
Payer: COMMERCIAL

## 2024-03-07 PROCEDURE — 73221 MRI JOINT UPR EXTREM W/O DYE: CPT | Mod: RT

## 2024-07-23 ENCOUNTER — RESULT REVIEW (OUTPATIENT)
Age: 39
End: 2024-07-23

## 2024-07-23 ENCOUNTER — APPOINTMENT (OUTPATIENT)
Dept: ULTRASOUND IMAGING | Facility: CLINIC | Age: 39
End: 2024-07-23
Payer: COMMERCIAL

## 2024-07-23 PROCEDURE — 76700 US EXAM ABDOM COMPLETE: CPT

## 2024-11-29 ENCOUNTER — NON-APPOINTMENT (OUTPATIENT)
Age: 39
End: 2024-11-29

## 2024-12-23 ENCOUNTER — APPOINTMENT (OUTPATIENT)
Dept: ULTRASOUND IMAGING | Facility: CLINIC | Age: 39
End: 2024-12-23
Payer: COMMERCIAL

## 2024-12-23 ENCOUNTER — APPOINTMENT (OUTPATIENT)
Dept: ULTRASOUND IMAGING | Facility: CLINIC | Age: 39
End: 2024-12-23

## 2024-12-23 PROCEDURE — 76801 OB US < 14 WKS SINGLE FETUS: CPT

## 2025-02-20 ENCOUNTER — APPOINTMENT (OUTPATIENT)
Dept: ANTEPARTUM | Facility: CLINIC | Age: 40
End: 2025-02-20
Payer: COMMERCIAL

## 2025-02-20 ENCOUNTER — NON-APPOINTMENT (OUTPATIENT)
Age: 40
End: 2025-02-20

## 2025-02-20 ENCOUNTER — ASOB RESULT (OUTPATIENT)
Age: 40
End: 2025-02-20

## 2025-02-20 PROCEDURE — 76811 OB US DETAILED SNGL FETUS: CPT

## 2025-02-20 PROCEDURE — 76817 TRANSVAGINAL US OBSTETRIC: CPT

## 2025-02-27 ENCOUNTER — APPOINTMENT (OUTPATIENT)
Dept: ANTEPARTUM | Facility: CLINIC | Age: 40
End: 2025-02-27
Payer: COMMERCIAL

## 2025-02-27 ENCOUNTER — ASOB RESULT (OUTPATIENT)
Age: 40
End: 2025-02-27

## 2025-02-27 PROCEDURE — 76816 OB US FOLLOW-UP PER FETUS: CPT

## 2025-03-24 ENCOUNTER — APPOINTMENT (OUTPATIENT)
Dept: PEDIATRIC CARDIOLOGY | Facility: CLINIC | Age: 40
End: 2025-03-24
Payer: COMMERCIAL

## 2025-03-24 DIAGNOSIS — Z3A.24 24 WEEKS GESTATION OF PREGNANCY: ICD-10-CM

## 2025-03-24 DIAGNOSIS — O35.9XX0 MATERNAL CARE FOR (SUSPECTED) FETAL ABNORMALITY AND DAMAGE, UNSPECIFIED, NOT APPLICABLE OR UNSPECIFIED: ICD-10-CM

## 2025-03-24 DIAGNOSIS — R79.89 OTHER SPECIFIED ABNORMAL FINDINGS OF BLOOD CHEMISTRY: ICD-10-CM

## 2025-03-24 DIAGNOSIS — E66.812 OBESITY, CLASS 2: ICD-10-CM

## 2025-03-24 DIAGNOSIS — Z87.19 PERSONAL HISTORY OF OTHER DISEASES OF THE DIGESTIVE SYSTEM: ICD-10-CM

## 2025-03-24 PROCEDURE — 93325 DOPPLER ECHO COLOR FLOW MAPG: CPT | Mod: 59

## 2025-03-24 PROCEDURE — 99205 OFFICE O/P NEW HI 60 MIN: CPT

## 2025-03-24 PROCEDURE — 76827 ECHO EXAM OF FETAL HEART: CPT

## 2025-03-24 PROCEDURE — 76820 UMBILICAL ARTERY ECHO: CPT

## 2025-03-24 PROCEDURE — 76821 MIDDLE CEREBRAL ARTERY ECHO: CPT

## 2025-03-24 PROCEDURE — 76825 ECHO EXAM OF FETAL HEART: CPT

## (undated) DEVICE — POSITIONER FOAM EGG CRATE ULNAR (2PCS)

## (undated) DEVICE — CONTAINER SPECIMEN 100ML

## (undated) DEVICE — NDL HYPO REGULAR BEVEL 22G X 1.5"

## (undated) DEVICE — LIGASURE IMPACT

## (undated) DEVICE — LONE STAR RETRACTOR SQUARE 14.1CMX14.1CM DISP

## (undated) DEVICE — GLV 7 PROTEXIS

## (undated) DEVICE — GLV 6.5 PROTEXIS

## (undated) DEVICE — DRSG TEGADERM 6"X8"

## (undated) DEVICE — DRAPE INSTRUMENT POUCH

## (undated) DEVICE — GLV 8.5 PROTEXIS

## (undated) DEVICE — BLANKET WARMER UPPER ADULT

## (undated) DEVICE — GLV 7.5 PROTEXIS

## (undated) DEVICE — SYR LUER LOK 10CC

## (undated) DEVICE — DRSG STERISTRIPS 0.5X4"

## (undated) DEVICE — DRSG ABDOMINAL BINDER MED/LG 12" X 45"-62"

## (undated) DEVICE — STAPLER SKIN VISI-STAT 35 WIDE

## (undated) DEVICE — MARKER SKIN MULTI TIP 6"

## (undated) DEVICE — DRAPE TOWEL BLUE 17" X 24"

## (undated) DEVICE — PACK MAJOR ABDOMINAL SUPINE

## (undated) DEVICE — NDL HYPO REGULAR BEVEL 25G X 1.5"

## (undated) DEVICE — DRSG ABDOMINAL BINDER XL 9" X 62"-74"

## (undated) DEVICE — GLV 8 PROTEXIS

## (undated) DEVICE — DRSG 4X4

## (undated) DEVICE — MEDICATION LABELS W MARKER

## (undated) DEVICE — STAPLER COVIDIEN ENDO GIA SHORT HANDLE

## (undated) DEVICE — BLADE SAFETY LOCK #15

## (undated) DEVICE — WRAP COMPRESSION CALF LG

## (undated) DEVICE — LONE STAR ELASTIC STAYS 5MM SHARP

## (undated) DEVICE — SOL IRR POUR NS 0.9% 500ML

## (undated) DEVICE — BLADE SAFETY LOCK #10

## (undated) DEVICE — GOWN TRIMAX LG

## (undated) DEVICE — SOL IRR POUR H2O 250ML